# Patient Record
Sex: MALE | Race: WHITE | NOT HISPANIC OR LATINO | Employment: FULL TIME | URBAN - METROPOLITAN AREA
[De-identification: names, ages, dates, MRNs, and addresses within clinical notes are randomized per-mention and may not be internally consistent; named-entity substitution may affect disease eponyms.]

---

## 2023-06-19 ENCOUNTER — OFFICE VISIT (OUTPATIENT)
Dept: PRIMARY CARE | Facility: CLINIC | Age: 33
End: 2023-06-19
Payer: COMMERCIAL

## 2023-06-19 VITALS
HEART RATE: 78 BPM | OXYGEN SATURATION: 97 % | SYSTOLIC BLOOD PRESSURE: 145 MMHG | BODY MASS INDEX: 26.69 KG/M2 | HEIGHT: 74 IN | DIASTOLIC BLOOD PRESSURE: 83 MMHG | WEIGHT: 208 LBS

## 2023-06-19 DIAGNOSIS — Z00.00 PREVENTATIVE HEALTH CARE: Primary | ICD-10-CM

## 2023-06-19 PROCEDURE — 1036F TOBACCO NON-USER: CPT | Performed by: NURSE PRACTITIONER

## 2023-06-19 PROCEDURE — 99385 PREV VISIT NEW AGE 18-39: CPT | Performed by: NURSE PRACTITIONER

## 2023-06-19 ASSESSMENT — PATIENT HEALTH QUESTIONNAIRE - PHQ9
SUM OF ALL RESPONSES TO PHQ9 QUESTIONS 1 AND 2: 0
1. LITTLE INTEREST OR PLEASURE IN DOING THINGS: NOT AT ALL
2. FEELING DOWN, DEPRESSED OR HOPELESS: NOT AT ALL

## 2023-06-19 ASSESSMENT — ENCOUNTER SYMPTOMS
NAUSEA: 0
SORE THROAT: 0
FEVER: 0
VOMITING: 0
WEAKNESS: 0
HEADACHES: 0
DIZZINESS: 0
MUSCULOSKELETAL NEGATIVE: 1
CHILLS: 0
PALPITATIONS: 0
FATIGUE: 0
SHORTNESS OF BREATH: 0
PSYCHIATRIC NEGATIVE: 1
COUGH: 0
DIARRHEA: 0
CONSTIPATION: 0
ABDOMINAL PAIN: 0
NUMBNESS: 0

## 2023-06-19 NOTE — PROGRESS NOTES
"Subjective   Patient ID: Chetan Nam is a 33 y.o. male who presents to establish relationship with primary care provider.      HPI   Lives with girlfriend.No children  Long family history of GI issues, UC and Crohns.  Does not have chronic issues himself.  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  Works many hours weekly.  Nonsmoker, rare alcohol use, no illicit drug use.  Denies chest pain, SOB, palpitations, dizziness,  or GI issues.        Review of Systems   Constitutional:  Negative for chills, fatigue and fever.   HENT:  Negative for congestion, ear pain and sore throat.    Eyes:  Negative for visual disturbance.   Respiratory:  Negative for cough and shortness of breath.    Cardiovascular:  Negative for chest pain, palpitations and leg swelling.   Gastrointestinal:  Negative for abdominal pain, constipation, diarrhea, nausea and vomiting.   Genitourinary: Negative.    Musculoskeletal: Negative.    Skin:  Negative for rash.   Neurological:  Negative for dizziness, weakness, numbness and headaches.   Psychiatric/Behavioral: Negative.         Objective   /83   Pulse 78   Ht 1.88 m (6' 2\")   Wt 94.3 kg (208 lb)   SpO2 97%   BMI 26.71 kg/m²     Physical Exam  Constitutional:       General: He is not in acute distress.     Appearance: Normal appearance.   HENT:      Head: Normocephalic and atraumatic.      Right Ear: Tympanic membrane, ear canal and external ear normal.      Left Ear: Tympanic membrane, ear canal and external ear normal.      Nose: Nose normal.      Mouth/Throat:      Mouth: Mucous membranes are moist.      Pharynx: Oropharynx is clear.   Eyes:      Extraocular Movements: Extraocular movements intact.      Conjunctiva/sclera: Conjunctivae normal.      Pupils: Pupils are equal, round, and reactive to light.   Cardiovascular:      Rate and Rhythm: Normal rate and regular rhythm.      Pulses: Normal pulses.      Heart sounds: Normal heart sounds. No " murmur heard.  Pulmonary:      Effort: Pulmonary effort is normal.      Breath sounds: Normal breath sounds. No wheezing, rhonchi or rales.   Abdominal:      General: Bowel sounds are normal.      Palpations: Abdomen is soft.      Tenderness: There is no abdominal tenderness.   Musculoskeletal:         General: Normal range of motion.      Cervical back: Normal range of motion and neck supple.   Lymphadenopathy:      Comments: No lymphadenopathy noted   Skin:     General: Skin is warm and dry.      Findings: No rash.   Neurological:      General: No focal deficit present.      Mental Status: He is alert and oriented to person, place, and time.      Cranial Nerves: No cranial nerve deficit.      Coordination: Coordination normal.      Gait: Gait normal.   Psychiatric:         Mood and Affect: Mood normal.         Behavior: Behavior normal.         Assessment/Plan   Problem List Items Addressed This Visit          Other    Preventative health care - Primary    Relevant Orders    Comprehensive Metabolic Panel    TSH with reflex to Free T4 if abnormal    Lipid Panel    Vitamin D, Total    CBC and Auto Differential

## 2023-06-21 ENCOUNTER — LAB (OUTPATIENT)
Dept: LAB | Facility: LAB | Age: 33
End: 2023-06-21
Payer: COMMERCIAL

## 2023-06-21 DIAGNOSIS — Z00.00 PREVENTATIVE HEALTH CARE: ICD-10-CM

## 2023-06-21 LAB
ALANINE AMINOTRANSFERASE (SGPT) (U/L) IN SER/PLAS: 23 U/L (ref 10–52)
ALBUMIN (G/DL) IN SER/PLAS: 4.8 G/DL (ref 3.4–5)
ALKALINE PHOSPHATASE (U/L) IN SER/PLAS: 74 U/L (ref 33–120)
ANION GAP IN SER/PLAS: 10 MMOL/L (ref 10–20)
ASPARTATE AMINOTRANSFERASE (SGOT) (U/L) IN SER/PLAS: 16 U/L (ref 9–39)
BASOPHILS (10*3/UL) IN BLOOD BY AUTOMATED COUNT: 0.03 X10E9/L (ref 0–0.1)
BASOPHILS/100 LEUKOCYTES IN BLOOD BY AUTOMATED COUNT: 0.4 % (ref 0–2)
BILIRUBIN TOTAL (MG/DL) IN SER/PLAS: 0.7 MG/DL (ref 0–1.2)
CALCIDIOL (25 OH VITAMIN D3) (NG/ML) IN SER/PLAS: 22 NG/ML
CALCIUM (MG/DL) IN SER/PLAS: 9.2 MG/DL (ref 8.6–10.3)
CARBON DIOXIDE, TOTAL (MMOL/L) IN SER/PLAS: 30 MMOL/L (ref 21–32)
CHLORIDE (MMOL/L) IN SER/PLAS: 106 MMOL/L (ref 98–107)
CHOLESTEROL (MG/DL) IN SER/PLAS: 119 MG/DL (ref 0–199)
CHOLESTEROL IN HDL (MG/DL) IN SER/PLAS: 33.4 MG/DL
CHOLESTEROL/HDL RATIO: 3.6
CREATININE (MG/DL) IN SER/PLAS: 0.9 MG/DL (ref 0.5–1.3)
EOSINOPHILS (10*3/UL) IN BLOOD BY AUTOMATED COUNT: 0.09 X10E9/L (ref 0–0.7)
EOSINOPHILS/100 LEUKOCYTES IN BLOOD BY AUTOMATED COUNT: 1.3 % (ref 0–6)
ERYTHROCYTE DISTRIBUTION WIDTH (RATIO) BY AUTOMATED COUNT: 12.2 % (ref 11.5–14.5)
ERYTHROCYTE MEAN CORPUSCULAR HEMOGLOBIN CONCENTRATION (G/DL) BY AUTOMATED: 34.3 G/DL (ref 32–36)
ERYTHROCYTE MEAN CORPUSCULAR VOLUME (FL) BY AUTOMATED COUNT: 84 FL (ref 80–100)
ERYTHROCYTES (10*6/UL) IN BLOOD BY AUTOMATED COUNT: 5.58 X10E12/L (ref 4.5–5.9)
GFR MALE: >90 ML/MIN/1.73M2
GLUCOSE (MG/DL) IN SER/PLAS: 105 MG/DL (ref 74–99)
HEMATOCRIT (%) IN BLOOD BY AUTOMATED COUNT: 46.9 % (ref 41–52)
HEMOGLOBIN (G/DL) IN BLOOD: 16.1 G/DL (ref 13.5–17.5)
IMMATURE GRANULOCYTES/100 LEUKOCYTES IN BLOOD BY AUTOMATED COUNT: 0.3 % (ref 0–0.9)
LDL: 70 MG/DL (ref 0–99)
LEUKOCYTES (10*3/UL) IN BLOOD BY AUTOMATED COUNT: 7 X10E9/L (ref 4.4–11.3)
LYMPHOCYTES (10*3/UL) IN BLOOD BY AUTOMATED COUNT: 1.49 X10E9/L (ref 1.2–4.8)
LYMPHOCYTES/100 LEUKOCYTES IN BLOOD BY AUTOMATED COUNT: 21.2 % (ref 13–44)
MONOCYTES (10*3/UL) IN BLOOD BY AUTOMATED COUNT: 0.45 X10E9/L (ref 0.1–1)
MONOCYTES/100 LEUKOCYTES IN BLOOD BY AUTOMATED COUNT: 6.4 % (ref 2–10)
NEUTROPHILS (10*3/UL) IN BLOOD BY AUTOMATED COUNT: 4.95 X10E9/L (ref 1.2–7.7)
NEUTROPHILS/100 LEUKOCYTES IN BLOOD BY AUTOMATED COUNT: 70.4 % (ref 40–80)
PLATELETS (10*3/UL) IN BLOOD AUTOMATED COUNT: 199 X10E9/L (ref 150–450)
POTASSIUM (MMOL/L) IN SER/PLAS: 4.7 MMOL/L (ref 3.5–5.3)
PROTEIN TOTAL: 6.6 G/DL (ref 6.4–8.2)
SODIUM (MMOL/L) IN SER/PLAS: 141 MMOL/L (ref 136–145)
THYROTROPIN (MIU/L) IN SER/PLAS BY DETECTION LIMIT <= 0.05 MIU/L: 1.4 MIU/L (ref 0.44–3.98)
TRIGLYCERIDE (MG/DL) IN SER/PLAS: 80 MG/DL (ref 0–149)
UREA NITROGEN (MG/DL) IN SER/PLAS: 24 MG/DL (ref 6–23)
VLDL: 16 MG/DL (ref 0–40)

## 2023-06-21 PROCEDURE — 80061 LIPID PANEL: CPT

## 2023-06-21 PROCEDURE — 80053 COMPREHEN METABOLIC PANEL: CPT

## 2023-06-21 PROCEDURE — 85025 COMPLETE CBC W/AUTO DIFF WBC: CPT

## 2023-06-21 PROCEDURE — 82306 VITAMIN D 25 HYDROXY: CPT

## 2023-06-21 PROCEDURE — 36415 COLL VENOUS BLD VENIPUNCTURE: CPT

## 2023-06-21 PROCEDURE — 84443 ASSAY THYROID STIM HORMONE: CPT

## 2024-04-09 ENCOUNTER — OFFICE VISIT (OUTPATIENT)
Dept: PRIMARY CARE | Facility: CLINIC | Age: 34
End: 2024-04-09
Payer: COMMERCIAL

## 2024-04-09 VITALS
HEIGHT: 74 IN | SYSTOLIC BLOOD PRESSURE: 134 MMHG | TEMPERATURE: 97.6 F | HEART RATE: 96 BPM | OXYGEN SATURATION: 96 % | WEIGHT: 216.6 LBS | DIASTOLIC BLOOD PRESSURE: 86 MMHG | BODY MASS INDEX: 27.8 KG/M2 | RESPIRATION RATE: 18 BRPM

## 2024-04-09 DIAGNOSIS — M25.542 ARTHRALGIA OF BOTH HANDS: ICD-10-CM

## 2024-04-09 DIAGNOSIS — Z13.29 THYROID DISORDER SCREEN: ICD-10-CM

## 2024-04-09 DIAGNOSIS — M25.541 ARTHRALGIA OF BOTH HANDS: ICD-10-CM

## 2024-04-09 DIAGNOSIS — R14.3 FLATULENCE: ICD-10-CM

## 2024-04-09 DIAGNOSIS — R53.82 CHRONIC FATIGUE: ICD-10-CM

## 2024-04-09 DIAGNOSIS — R10.11 RUQ ABDOMINAL PAIN: ICD-10-CM

## 2024-04-09 DIAGNOSIS — Z13.220 SCREENING, LIPID: ICD-10-CM

## 2024-04-09 DIAGNOSIS — R19.5 LOOSE STOOLS: Primary | ICD-10-CM

## 2024-04-09 DIAGNOSIS — M54.50 ACUTE LEFT-SIDED LOW BACK PAIN WITHOUT SCIATICA: ICD-10-CM

## 2024-04-09 PROCEDURE — 1036F TOBACCO NON-USER: CPT | Performed by: NURSE PRACTITIONER

## 2024-04-09 PROCEDURE — 99215 OFFICE O/P EST HI 40 MIN: CPT | Performed by: NURSE PRACTITIONER

## 2024-04-09 RX ORDER — CYCLOBENZAPRINE HCL 5 MG
5 TABLET ORAL NIGHTLY PRN
Qty: 30 TABLET | Refills: 0 | Status: SHIPPED | OUTPATIENT
Start: 2024-04-09 | End: 2024-06-08

## 2024-04-09 RX ORDER — PREDNISONE 20 MG/1
40 TABLET ORAL DAILY
Qty: 10 TABLET | Refills: 0 | Status: SHIPPED | OUTPATIENT
Start: 2024-04-09 | End: 2024-04-14

## 2024-04-09 ASSESSMENT — ENCOUNTER SYMPTOMS
CONSTIPATION: 0
DIZZINESS: 0
VOMITING: 0
WEAKNESS: 0
CHILLS: 0
NAUSEA: 0
HEMATURIA: 0
CONFUSION: 0
BACK PAIN: 1
PALPITATIONS: 0
NERVOUS/ANXIOUS: 0
WHEEZING: 0
DIARRHEA: 1
DIAPHORESIS: 0
ABDOMINAL PAIN: 1
CHEST TIGHTNESS: 0
FATIGUE: 1
SHORTNESS OF BREATH: 0
HEADACHES: 0
FREQUENCY: 0
BLOOD IN STOOL: 0
WOUND: 0
SLEEP DISTURBANCE: 0

## 2024-04-09 ASSESSMENT — PATIENT HEALTH QUESTIONNAIRE - PHQ9
2. FEELING DOWN, DEPRESSED OR HOPELESS: NOT AT ALL
1. LITTLE INTEREST OR PLEASURE IN DOING THINGS: NOT AT ALL
SUM OF ALL RESPONSES TO PHQ9 QUESTIONS 1 AND 2: 0

## 2024-04-09 NOTE — PROGRESS NOTES
"Subjective   Patient ID: Chetan Nam is a 34 y.o. male who presents for New Patient Visit, Back Pain (Lower left side), and Fatigue.    New Patient to establish Care.   Patient with back pain, fatigue.     Back pain: Left lower back, feels like a throbbing pain. Intermittent. Not consistent     He reports he is very active. After short tasks he feels like \" my heart is beating throughout of entire body\".   Feels more worn after doing same job he's always done.   Denies Chest pain     He's questioning lyme disease. Reports a hx of frequent tick bites.   Occasional loose stools and increasing flatulence. Has always associated with his food choices.   Denies blood in stools, nausea, constipation   Mom has crohns and brother has UC      Bilateral hand pain/ worse in mornings. Family hx of OA  Patient has worked manual labor for many years      Back Pain  Associated symptoms include abdominal pain. Pertinent negatives include no chest pain, headaches or weakness.   Fatigue  Associated symptoms include abdominal pain and fatigue. Pertinent negatives include no chest pain, chills, diaphoresis, headaches, nausea, rash, vomiting or weakness.        Review of Systems   Constitutional:  Positive for fatigue. Negative for chills and diaphoresis.   Respiratory:  Negative for chest tightness, shortness of breath and wheezing.    Cardiovascular:  Negative for chest pain and palpitations.   Gastrointestinal:  Positive for abdominal pain and diarrhea. Negative for blood in stool, constipation, nausea and vomiting.   Genitourinary:  Negative for frequency and hematuria.   Musculoskeletal:  Positive for back pain.   Skin:  Negative for rash and wound.   Neurological:  Negative for dizziness, weakness and headaches.   Psychiatric/Behavioral:  Negative for confusion, self-injury, sleep disturbance and suicidal ideas. The patient is not nervous/anxious.        Objective   /86   Pulse 96   Temp 36.4 °C (97.6 °F) (Temporal)   " "Resp 18   Ht 1.88 m (6' 2\")   Wt 98.2 kg (216 lb 9.6 oz)   SpO2 96%   BMI 27.81 kg/m²     Physical Exam  Vitals reviewed.   Constitutional:       Appearance: Normal appearance.   Cardiovascular:      Rate and Rhythm: Normal rate and regular rhythm.      Pulses: Normal pulses.      Heart sounds: Normal heart sounds.   Pulmonary:      Effort: Pulmonary effort is normal.      Breath sounds: Normal breath sounds.   Abdominal:      Tenderness: There is no abdominal tenderness. There is no guarding.   Musculoskeletal:         General: No swelling or tenderness. Normal range of motion.        Arms:       Lumbar back: Spasms present. No swelling or edema. Normal range of motion.        Back:    Skin:     General: Skin is warm and dry.   Neurological:      Mental Status: He is alert and oriented to person, place, and time.   Psychiatric:         Mood and Affect: Mood normal.         Behavior: Behavior normal.         Assessment/Plan   Problem List Items Addressed This Visit             ICD-10-CM    Acute left-sided low back pain without sciatica M54.50     Start prednisone 20 mg as ordered  Flexeril 5 mg nightly as needed  Follow-up 4 weeks  Call for worsening  Low back exercises recommended.  PDF printed for patient         Relevant Medications    predniSONE (Deltasone) 20 mg tablet    cyclobenzaprine (Flexeril) 5 mg tablet    Other Relevant Orders    CBC and Auto Differential    Comprehensive Metabolic Panel    Loose stools - Primary R19.5     Labs, stol collection ordered   Follow up 4-6 weeks   Call for worsening   US gallbladder          Relevant Orders    CBC and Auto Differential    Comprehensive Metabolic Panel    Calprotectin Stool    Tissue Transglutaminase IgG    Flatulence R14.3     Recommend otc gas x as needed   Follow up 4-6 weeks         Relevant Orders    Tissue Transglutaminase IgG    RUQ abdominal pain R10.11     US of gallbladder   Follow up 4-6 weeks   Calprotectin ordered          Relevant Orders    " US gallbladder    Chronic fatigue R53.82     Lyme panel, CBC, iron and tibc, vitamin b12, vitamin d ordered            Relevant Orders    CBC and Auto Differential    Comprehensive Metabolic Panel    Lyme disease, PCR    Vitamin B12    Vitamin D 1,25 Dihydroxy (for eval of hypercalcemia)    Iron and TIBC    Ferritin    Arthralgia of both hands M25.541, M25.542     Otc tylenol 650mg (2 tabs once daily )   Rto in 4-6 weeks            Relevant Orders    Sedimentation Rate    Rheumatoid Factor     Other Visit Diagnoses         Codes    Screening, lipid     Z13.220    Relevant Orders    Lipid Panel    Thyroid disorder screen     Z13.29    Relevant Orders    TSH with reflex to Free T4 if abnormal

## 2024-04-09 NOTE — ASSESSMENT & PLAN NOTE
Start prednisone 20 mg as ordered  Flexeril 5 mg nightly as needed  Follow-up 4 weeks  Call for worsening  Low back exercises recommended.  PDF printed for patient

## 2024-05-01 ENCOUNTER — HOSPITAL ENCOUNTER (OUTPATIENT)
Dept: RADIOLOGY | Facility: CLINIC | Age: 34
Discharge: HOME | End: 2024-05-01
Payer: COMMERCIAL

## 2024-05-01 ENCOUNTER — LAB (OUTPATIENT)
Dept: LAB | Facility: LAB | Age: 34
End: 2024-05-01
Payer: COMMERCIAL

## 2024-05-01 DIAGNOSIS — Z13.220 SCREENING, LIPID: ICD-10-CM

## 2024-05-01 DIAGNOSIS — R10.11 RUQ ABDOMINAL PAIN: ICD-10-CM

## 2024-05-01 DIAGNOSIS — M54.50 ACUTE LEFT-SIDED LOW BACK PAIN WITHOUT SCIATICA: ICD-10-CM

## 2024-05-01 DIAGNOSIS — Z13.29 THYROID DISORDER SCREEN: ICD-10-CM

## 2024-05-01 DIAGNOSIS — M25.541 ARTHRALGIA OF BOTH HANDS: ICD-10-CM

## 2024-05-01 DIAGNOSIS — R19.5 LOOSE STOOLS: ICD-10-CM

## 2024-05-01 DIAGNOSIS — R53.82 CHRONIC FATIGUE: ICD-10-CM

## 2024-05-01 DIAGNOSIS — M25.542 ARTHRALGIA OF BOTH HANDS: ICD-10-CM

## 2024-05-01 DIAGNOSIS — R14.3 FLATULENCE: ICD-10-CM

## 2024-05-01 LAB
ALBUMIN SERPL BCP-MCNC: 4.6 G/DL (ref 3.4–5)
ALP SERPL-CCNC: 63 U/L (ref 33–120)
ALT SERPL W P-5'-P-CCNC: 20 U/L (ref 10–52)
ANION GAP SERPL CALC-SCNC: 9 MMOL/L (ref 10–20)
AST SERPL W P-5'-P-CCNC: 15 U/L (ref 9–39)
BASOPHILS # BLD AUTO: 0.03 X10*3/UL (ref 0–0.1)
BASOPHILS NFR BLD AUTO: 0.5 %
BILIRUB SERPL-MCNC: 1 MG/DL (ref 0–1.2)
BUN SERPL-MCNC: 19 MG/DL (ref 6–23)
CALCIUM SERPL-MCNC: 9.1 MG/DL (ref 8.6–10.3)
CHLORIDE SERPL-SCNC: 105 MMOL/L (ref 98–107)
CHOLEST SERPL-MCNC: 141 MG/DL (ref 0–199)
CHOLESTEROL/HDL RATIO: 3.3
CO2 SERPL-SCNC: 30 MMOL/L (ref 21–32)
CREAT SERPL-MCNC: 0.89 MG/DL (ref 0.5–1.3)
EGFRCR SERPLBLD CKD-EPI 2021: >90 ML/MIN/1.73M*2
EOSINOPHIL # BLD AUTO: 0.06 X10*3/UL (ref 0–0.7)
EOSINOPHIL NFR BLD AUTO: 1 %
ERYTHROCYTE [DISTWIDTH] IN BLOOD BY AUTOMATED COUNT: 12.1 % (ref 11.5–14.5)
ERYTHROCYTE [SEDIMENTATION RATE] IN BLOOD BY WESTERGREN METHOD: <1 MM/H (ref 0–15)
FERRITIN SERPL-MCNC: 315 NG/ML (ref 20–300)
GLUCOSE SERPL-MCNC: 112 MG/DL (ref 74–99)
HCT VFR BLD AUTO: 46.7 % (ref 41–52)
HDLC SERPL-MCNC: 42.5 MG/DL
HGB BLD-MCNC: 15.6 G/DL (ref 13.5–17.5)
IMM GRANULOCYTES # BLD AUTO: 0.02 X10*3/UL (ref 0–0.7)
IMM GRANULOCYTES NFR BLD AUTO: 0.3 % (ref 0–0.9)
IRON SATN MFR SERPL: 44 % (ref 25–45)
IRON SERPL-MCNC: 138 UG/DL (ref 35–150)
LDLC SERPL CALC-MCNC: 78 MG/DL
LYMPHOCYTES # BLD AUTO: 1.33 X10*3/UL (ref 1.2–4.8)
LYMPHOCYTES NFR BLD AUTO: 22.2 %
MCH RBC QN AUTO: 28.5 PG (ref 26–34)
MCHC RBC AUTO-ENTMCNC: 33.4 G/DL (ref 32–36)
MCV RBC AUTO: 85 FL (ref 80–100)
MONOCYTES # BLD AUTO: 0.39 X10*3/UL (ref 0.1–1)
MONOCYTES NFR BLD AUTO: 6.5 %
NEUTROPHILS # BLD AUTO: 4.16 X10*3/UL (ref 1.2–7.7)
NEUTROPHILS NFR BLD AUTO: 69.5 %
NON HDL CHOLESTEROL: 99 MG/DL (ref 0–149)
NRBC BLD-RTO: 0 /100 WBCS (ref 0–0)
PLATELET # BLD AUTO: 182 X10*3/UL (ref 150–450)
POTASSIUM SERPL-SCNC: 4 MMOL/L (ref 3.5–5.3)
PROT SERPL-MCNC: 6.7 G/DL (ref 6.4–8.2)
RBC # BLD AUTO: 5.48 X10*6/UL (ref 4.5–5.9)
SODIUM SERPL-SCNC: 140 MMOL/L (ref 136–145)
TIBC SERPL-MCNC: 313 UG/DL (ref 240–445)
TRIGL SERPL-MCNC: 105 MG/DL (ref 0–149)
TSH SERPL-ACNC: 1.52 MIU/L (ref 0.44–3.98)
UIBC SERPL-MCNC: 175 UG/DL (ref 110–370)
VIT B12 SERPL-MCNC: 317 PG/ML (ref 211–911)
VLDL: 21 MG/DL (ref 0–40)
WBC # BLD AUTO: 6 X10*3/UL (ref 4.4–11.3)

## 2024-05-01 PROCEDURE — 76705 ECHO EXAM OF ABDOMEN: CPT

## 2024-05-01 PROCEDURE — 82652 VIT D 1 25-DIHYDROXY: CPT

## 2024-05-01 PROCEDURE — 84443 ASSAY THYROID STIM HORMONE: CPT

## 2024-05-01 PROCEDURE — 80053 COMPREHEN METABOLIC PANEL: CPT

## 2024-05-01 PROCEDURE — 85025 COMPLETE CBC W/AUTO DIFF WBC: CPT

## 2024-05-01 PROCEDURE — 86431 RHEUMATOID FACTOR QUANT: CPT

## 2024-05-01 PROCEDURE — 82728 ASSAY OF FERRITIN: CPT

## 2024-05-01 PROCEDURE — 85652 RBC SED RATE AUTOMATED: CPT

## 2024-05-01 PROCEDURE — 87476 LYME DIS DNA AMP PROBE: CPT

## 2024-05-01 PROCEDURE — 83550 IRON BINDING TEST: CPT

## 2024-05-01 PROCEDURE — 82607 VITAMIN B-12: CPT

## 2024-05-01 PROCEDURE — 83993 ASSAY FOR CALPROTECTIN FECAL: CPT

## 2024-05-01 PROCEDURE — 83516 IMMUNOASSAY NONANTIBODY: CPT

## 2024-05-01 PROCEDURE — 76705 ECHO EXAM OF ABDOMEN: CPT | Performed by: RADIOLOGY

## 2024-05-01 PROCEDURE — 36415 COLL VENOUS BLD VENIPUNCTURE: CPT

## 2024-05-01 PROCEDURE — 80061 LIPID PANEL: CPT

## 2024-05-01 PROCEDURE — 83540 ASSAY OF IRON: CPT

## 2024-05-02 ENCOUNTER — TELEPHONE (OUTPATIENT)
Dept: PRIMARY CARE | Facility: CLINIC | Age: 34
End: 2024-05-02
Payer: COMMERCIAL

## 2024-05-02 LAB — RHEUMATOID FACT SER NEPH-ACNC: <10 IU/ML (ref 0–15)

## 2024-05-03 LAB
1,25(OH)2D3 SERPL-MCNC: 49.7 PG/ML (ref 19.9–79.3)
CALPROTECTIN STL-MCNT: 23 UG/G
TTG IGG SER IA-ACNC: <0.82 FLU (ref 0–4.99)

## 2024-05-04 LAB
B BURGDOR DNA SPEC QL NAA+PROBE: NOT DETECTED
SPECIMEN SOURCE: NORMAL

## 2024-05-14 ENCOUNTER — OFFICE VISIT (OUTPATIENT)
Dept: PRIMARY CARE | Facility: CLINIC | Age: 34
End: 2024-05-14
Payer: COMMERCIAL

## 2024-05-14 VITALS
BODY MASS INDEX: 28.34 KG/M2 | DIASTOLIC BLOOD PRESSURE: 76 MMHG | OXYGEN SATURATION: 94 % | TEMPERATURE: 98.2 F | HEART RATE: 71 BPM | SYSTOLIC BLOOD PRESSURE: 131 MMHG | HEIGHT: 74 IN | WEIGHT: 220.8 LBS

## 2024-05-14 DIAGNOSIS — E78.00 ELEVATED LDL CHOLESTEROL LEVEL: ICD-10-CM

## 2024-05-14 DIAGNOSIS — R10.11 RUQ ABDOMINAL PAIN: ICD-10-CM

## 2024-05-14 DIAGNOSIS — E53.8 B12 DEFICIENCY: ICD-10-CM

## 2024-05-14 DIAGNOSIS — Z13.29 THYROID DISORDER SCREENING: ICD-10-CM

## 2024-05-14 DIAGNOSIS — R19.5 LOOSE STOOLS: ICD-10-CM

## 2024-05-14 DIAGNOSIS — M15.9 PRIMARY OSTEOARTHRITIS INVOLVING MULTIPLE JOINTS: Primary | ICD-10-CM

## 2024-05-14 PROBLEM — M15.0 PRIMARY OSTEOARTHRITIS INVOLVING MULTIPLE JOINTS: Status: ACTIVE | Noted: 2024-05-14

## 2024-05-14 PROCEDURE — 1036F TOBACCO NON-USER: CPT | Performed by: NURSE PRACTITIONER

## 2024-05-14 PROCEDURE — 99213 OFFICE O/P EST LOW 20 MIN: CPT | Performed by: NURSE PRACTITIONER

## 2024-05-14 ASSESSMENT — ENCOUNTER SYMPTOMS
NAUSEA: 0
ABDOMINAL PAIN: 0
NERVOUS/ANXIOUS: 0
WEAKNESS: 0
ROS GI COMMENTS: LOOSE STOOLS
ACTIVITY CHANGE: 0
FEVER: 0
PALPITATIONS: 0
DIZZINESS: 0
HEADACHES: 0
CHILLS: 0
RESPIRATORY NEGATIVE: 1
VOMITING: 0
BACK PAIN: 1
APNEA: 0
SHORTNESS OF BREATH: 0
CONSTITUTIONAL NEGATIVE: 1
CONFUSION: 0
COUGH: 0
ARTHRALGIAS: 1
FATIGUE: 0

## 2024-05-14 NOTE — ASSESSMENT & PLAN NOTE
.=== 05/01/24 ===    US GALLBLADDER    - Impression -  Grossly unremarkable right upper quadrant ultrasound.

## 2024-05-14 NOTE — ASSESSMENT & PLAN NOTE
Referral to ortho   No significant findings   Recommend tylenol 650mg prn   No improvement on prednisone   Not taking flexeril ??  Rheumatoid factor negative, sed rate unremarkable

## 2024-05-14 NOTE — PROGRESS NOTES
"Subjective   Patient ID: Chetan Nam is a 34 y.o. male who presents for Follow-up (4-6 week follow up labs ), Arthritis, and Diarrhea.    Low Back Pain: \" thought I felt worse on prednisone\"   Pain worsened. Continued course of treatment. \" Now I maybe feel better\" Described as a dull ache. Intermittent   Pain in lower back on left side. Denies hx of kidney stones, denies hematuria   Complains of knee pain, bilateral hand pain  Neg rheumatoid factor, sed rate  Family hx of rheumatoid arthritis   No swelling reported, no fever.  Recommended tylenol daily but reports he has not tried taking daily       Loose stools: intermittent loose stools continue   Negative calprotectin, normal CBC   2-3 times a day occasionally   Denies n/v   No specific diet changes.            Review of Systems   Constitutional: Negative.  Negative for activity change, chills, fatigue and fever.   Respiratory: Negative.  Negative for apnea, cough and shortness of breath.    Cardiovascular:  Negative for chest pain and palpitations.   Gastrointestinal:  Negative for abdominal pain, nausea and vomiting.        Loose stools    Musculoskeletal:  Positive for arthralgias and back pain.   Neurological:  Negative for dizziness, weakness and headaches.   Psychiatric/Behavioral:  Negative for confusion. The patient is not nervous/anxious.        Objective   /76 (BP Location: Left arm, Patient Position: Sitting)   Pulse 71   Temp 36.8 °C (98.2 °F)   Ht 1.88 m (6' 2\")   Wt 100 kg (220 lb 12.8 oz)   SpO2 94%   BMI 28.35 kg/m²     Physical Exam  Vitals reviewed.   Constitutional:       Appearance: Normal appearance.   Cardiovascular:      Rate and Rhythm: Normal rate and regular rhythm.      Pulses: Normal pulses.      Heart sounds: Normal heart sounds.   Pulmonary:      Effort: Pulmonary effort is normal.      Breath sounds: Normal breath sounds.   Abdominal:      General: Bowel sounds are normal.   Musculoskeletal:         General: No " swelling. Normal range of motion.   Neurological:      Mental Status: He is alert and oriented to person, place, and time.   Psychiatric:         Mood and Affect: Mood normal.         Behavior: Behavior normal.         Assessment/Plan   Problem List Items Addressed This Visit             ICD-10-CM    Loose stools R19.5     Referral to GI for eval   Take otc probiotic, increase fiber   Keep food journal daily          Relevant Orders    Referral to Gastroenterology    RUQ abdominal pain R10.11     .=== 05/01/24 ===    US GALLBLADDER    - Impression -  Grossly unremarkable right upper quadrant ultrasound.             Primary osteoarthritis involving multiple joints - Primary M15.9     Referral to ortho   No significant findings   Recommend tylenol 650mg prn   No improvement on prednisone   Not taking flexeril ??  Rheumatoid factor negative, sed rate unremarkable          Relevant Orders    Referral to Orthopaedic Surgery

## 2024-05-14 NOTE — PROGRESS NOTES
"Subjective   Patient ID: Chetan Nam is a 34 y.o. male who presents for Follow-up (4-6 week follow up labs ).        RUQ abdominal pain   US of gallbladder completed on 5/1/24, negative for gallstones and distention, absent of renal calculi, hydronephrosis, and Liver negative for lesions. Calprotectin was 23, CBC, and CMP unremarkable.      Lower left back pain  predniSONE (Deltasone) 20 mg tablet and cyclobenzaprine (Flexeril) 5 mg tablets started last visit. Patient stated pain/ soreness has radiated to elbows and shoulders. Patient took flexeril for 3 days and prednisone for 5 days and has not had pain has not improved.    Loose stools  Patient states he is still having loose stools once a week, denies blood in stool, flatulence, and abdominal pain/cramping, no OTC treatment, reports no change in diet.             Review of Systems    Objective   /76 (BP Location: Left arm, Patient Position: Sitting)   Pulse 71   Temp 36.8 °C (98.2 °F)   Ht 1.88 m (6' 2\")   Wt 100 kg (220 lb 12.8 oz)   SpO2 94%   BMI 28.35 kg/m²     Physical Exam    Assessment/Plan          "

## 2024-05-17 ENCOUNTER — HOSPITAL ENCOUNTER (OUTPATIENT)
Dept: RADIOLOGY | Facility: EXTERNAL LOCATION | Age: 34
Discharge: HOME | End: 2024-05-17

## 2024-05-17 ENCOUNTER — HOSPITAL ENCOUNTER (OUTPATIENT)
Dept: RADIOLOGY | Facility: CLINIC | Age: 34
Discharge: HOME | End: 2024-05-17
Payer: COMMERCIAL

## 2024-05-17 ENCOUNTER — OFFICE VISIT (OUTPATIENT)
Dept: ORTHOPEDIC SURGERY | Facility: CLINIC | Age: 34
End: 2024-05-17
Payer: COMMERCIAL

## 2024-05-17 VITALS — WEIGHT: 220 LBS | HEIGHT: 74 IN | BODY MASS INDEX: 28.23 KG/M2

## 2024-05-17 DIAGNOSIS — M25.561 ACUTE BILATERAL KNEE PAIN: ICD-10-CM

## 2024-05-17 DIAGNOSIS — M17.0 OSTEOARTHRITIS OF PATELLOFEMORAL JOINTS, BILATERAL: Primary | ICD-10-CM

## 2024-05-17 DIAGNOSIS — M25.562 ACUTE BILATERAL KNEE PAIN: ICD-10-CM

## 2024-05-17 PROCEDURE — 73564 X-RAY EXAM KNEE 4 OR MORE: CPT | Mod: BILATERAL PROCEDURE | Performed by: RADIOLOGY

## 2024-05-17 PROCEDURE — 20611 DRAIN/INJ JOINT/BURSA W/US: CPT | Performed by: EMERGENCY MEDICINE

## 2024-05-17 PROCEDURE — 99244 OFF/OP CNSLTJ NEW/EST MOD 40: CPT | Performed by: EMERGENCY MEDICINE

## 2024-05-17 PROCEDURE — 73564 X-RAY EXAM KNEE 4 OR MORE: CPT | Mod: 50

## 2024-05-17 RX ORDER — LIDOCAINE HYDROCHLORIDE 10 MG/ML
4 INJECTION INFILTRATION; PERINEURAL
Status: COMPLETED | OUTPATIENT
Start: 2024-05-17 | End: 2024-05-17

## 2024-05-17 RX ORDER — METHYLPREDNISOLONE ACETATE 40 MG/ML
80 INJECTION, SUSPENSION INTRA-ARTICULAR; INTRALESIONAL; INTRAMUSCULAR; SOFT TISSUE
Status: COMPLETED | OUTPATIENT
Start: 2024-05-17 | End: 2024-05-17

## 2024-05-17 RX ADMIN — METHYLPREDNISOLONE ACETATE 80 MG: 40 INJECTION, SUSPENSION INTRA-ARTICULAR; INTRALESIONAL; INTRAMUSCULAR; SOFT TISSUE at 10:44

## 2024-05-17 RX ADMIN — LIDOCAINE HYDROCHLORIDE 4 ML: 10 INJECTION INFILTRATION; PERINEURAL at 10:44

## 2024-05-17 NOTE — PROGRESS NOTES
Subjective    Patient ID: Chetan Nam is a 34 y.o. male.    Chief Complaint: Pain of the Right Knee (Referred by Skylar Joy/Started about 8 years ago, denies any injury. Worse in the morning. Notices most when he is relaxing. He tried Prednisone and feels like that made it worse. Has tried Cyclobenzaprine and states that doesn't help./Arthritis runs the family. /Xr today ) and Pain of the Left Knee     Last Surgery: No surgery found  Last Surgery Date: No surgery found    Chetan is a very pleasant 34-year-old male who works as a  squatting and on his hands and knees frequently throughout the day coming in with some acute on chronic bilateral knee pain that is peripatellar moderate in severity and worse with activity.  He was referred here by his PCP, Skylar Joy, who recently gave him a short course of prednisone that did not provide much relief.  He had x-rays performed on 5/14/2024.  His pain is moderate here today.  He wears knee protection at work most of the time.  He denies any trauma.  No infectious symptoms.  Other than the prednisone he is not currently taking any other medications for his pain.  He would like to avoid surgery if at all possible.        Objective   Right Knee Exam     Muscle Strength   The patient has normal right knee strength.    Tenderness   Right knee tenderness location: Positive patellar grind.    Range of Motion   The patient has normal right knee ROM.  Extension:  normal   Flexion:  normal     Tests   Gilmar:  Medial - negative Lateral - negative  Varus: negative Valgus: negative  Lachman:  Anterior - negative      Drawer:  Anterior - negative    Posterior - negative    Other   Erythema: absent  Sensation: normal  Pulse: present  Swelling: none  Effusion: no effusion present      Left Knee Exam     Muscle Strength   The patient has normal left knee strength.    Tenderness   Left knee tenderness location: Positive patellar grind.    Range of Motion   The patient has  normal left knee ROM.  Extension:  normal   Flexion:  normal     Tests   Gilmar:  Medial - negative Lateral - negative  Varus: negative Valgus: negative  Lachman:  Anterior - negative      Drawer:  Anterior - negative     Posterior - negative    Other   Erythema: absent  Sensation: normal  Pulse: present  Swelling: none  Effusion: no effusion present            Image Results:  Point of Care Ultrasound  These images are not reportable by radiology and will not be interpreted   by  Radiologists.    Bilateral knee x-rays were reviewed and interpreted by me on 5/17/2024 and revealed moderate to severe patellofemoral DJD without any evidence of acute injuries or fractures.    Patient ID: Chetan Nam is a 34 y.o. male.    L Inj/Asp: bilateral knee on 5/17/2024 10:44 AM  Indications: pain and joint swelling  Details: 22 G needle, ultrasound-guided superolateral approach  Medications (Right): 80 mg methylPREDNISolone acetate 40 mg/mL; 4 mL lidocaine 10 mg/mL (1 %)  Medications (Left): 4 mL lidocaine 10 mg/mL (1 %); 80 mg methylPREDNISolone acetate 40 mg/mL  Outcome: tolerated well, no immediate complications  Procedure, treatment alternatives, risks and benefits explained, specific risks discussed. Consent was given by the patient. Immediately prior to procedure a time out was called to verify the correct patient, procedure, equipment, support staff and site/side marked as required. Patient was prepped and draped in the usual sterile fashion.           Assessment/Plan   Encounter Diagnoses:  Osteoarthritis of patellofemoral joints, bilateral    Acute bilateral knee pain    Orders Placed This Encounter    L Inj/Asp    XR knee 4+ views bilateral    Point of Care Ultrasound    Referral to Physical Therapy     No follow-ups on file.    We discussed his treatment options and agreed to perform bilateral knee injections here today under ultrasound guidance.  The patient tolerated the procedures well without any  complications.  He will also be sent to physical therapy with instructions to develop and implement a home exercise program.  We talked about him starting prescription dose Tylenol 3 times daily along with Voltaren as needed for his pain and breakthrough symptoms.  He will follow-up with me in about 4 weeks to determine his response to this plan.  At that time we could consider viscosupplementation or PRP injections as needed.  Both were discussed today.    ** Please excuse any errors in grammar or translation related to this dictation. Voice recognition software was utilized to prepare this document. **       Fredrick Markham MD  [facility]

## 2024-05-23 DIAGNOSIS — M79.641 BILATERAL HAND PAIN: ICD-10-CM

## 2024-05-23 DIAGNOSIS — M79.642 BILATERAL HAND PAIN: ICD-10-CM

## 2024-05-30 ENCOUNTER — OFFICE VISIT (OUTPATIENT)
Dept: ORTHOPEDIC SURGERY | Facility: CLINIC | Age: 34
End: 2024-05-30
Payer: COMMERCIAL

## 2024-05-30 ENCOUNTER — HOSPITAL ENCOUNTER (OUTPATIENT)
Dept: RADIOLOGY | Facility: HOSPITAL | Age: 34
Discharge: HOME | End: 2024-05-30
Payer: COMMERCIAL

## 2024-05-30 VITALS — WEIGHT: 220 LBS | BODY MASS INDEX: 28.23 KG/M2 | HEIGHT: 74 IN

## 2024-05-30 DIAGNOSIS — M79.642 BILATERAL HAND PAIN: ICD-10-CM

## 2024-05-30 DIAGNOSIS — G56.01 CARPAL TUNNEL SYNDROME OF RIGHT WRIST: ICD-10-CM

## 2024-05-30 DIAGNOSIS — G56.03 BILATERAL CARPAL TUNNEL SYNDROME: ICD-10-CM

## 2024-05-30 DIAGNOSIS — M79.641 BILATERAL HAND PAIN: ICD-10-CM

## 2024-05-30 DIAGNOSIS — G56.02 CARPAL TUNNEL SYNDROME ON LEFT: Primary | ICD-10-CM

## 2024-05-30 PROCEDURE — 73130 X-RAY EXAM OF HAND: CPT | Mod: BILATERAL PROCEDURE | Performed by: RADIOLOGY

## 2024-05-30 PROCEDURE — L3908 WHO COCK-UP NONMOLDE PRE OTS: HCPCS | Performed by: ORTHOPAEDIC SURGERY

## 2024-05-30 PROCEDURE — 1036F TOBACCO NON-USER: CPT | Performed by: ORTHOPAEDIC SURGERY

## 2024-05-30 PROCEDURE — 73130 X-RAY EXAM OF HAND: CPT | Mod: 50

## 2024-05-30 PROCEDURE — 99204 OFFICE O/P NEW MOD 45 MIN: CPT | Performed by: ORTHOPAEDIC SURGERY

## 2024-05-30 NOTE — PROGRESS NOTES
34 y.o. male presents today for evaluation of bilateral index ring and long finger MCP pain and hand numbness, tingling, weakness and pain. The patient reports symptoms for months, getting worse.  Pain is controlled.  Reports no previous surgeries, injections or trauma to the area.  Reports pain worse with use, better at rest.  Pain numb and tingly, can wake them from sleep.   Worse driving a car and talking on a phone.   Right worse than left.  Works as a  and worse during work.    Review of Systems    Constitutional: see HPI, no fever, no chills, not feeling tired, no significant weight gain or weight loss.   Eyes: No vision changes  ENT: no nosebleeds.   Cardiovascular: no chest pain.   Respiratory: no shortness of breath and no cough.   Gastrointestinal: no abdominal pain, no nausea, no vomiting and no diarrhea.   Musculoskeletal: per HPI  Neurological: no headache, no gait disturbances  Psychiatric: no depression and no sleep disturbances.   Endocrine: no muscle weakness and no muscle cramps.   Hematologic/Lymphatic: no swollen glands and no tendency for easy bruising or excessive swelling.     Patient's past medical history, past surgical history, allergies, and medications have been reviewed unless otherwise noted in the chart.     Finger Exam  Inspection:  no evidence of infection, no erythema, mild edema, no ecchymosis, Palpation:  compartments are soft, no tenderness with palpation MCP or A1 pulleys, Range of Motion:  full wrist and finger range of motion, Stability:  no wrist instability, Strength:  5/5 wrist and finger flexion/extension, Skin:  intact, Vascular:  capillary refill <2 seconds distally, Sensation:  sensation intact to light touch distally, Other:  no pain with palpation or motion proximally in the elbow.        Carpal Tunnel Exam  Inspection:  no evidence of infection, no edema, no erythema, no ecchymosis, Palpation:  compartments are soft, no pain with palpation, Range of Motion:   full wrist and finger range of motion, Stability:  no wrist instability detected, Strength:  5/5 APB and intrinsics, Skin:  intact, Vascular:  capillary refill <2 seconds distally, Sensation:  decreased in the median nerve distribution, Test: Negative Tinel's at the Carpal Tunnel, negative direct Carpal Tunnel Compression Test      Constitutional   General appearance: Alert and in no acute distress. Well developed, well nourished.    Eyes   External Eye, Conjunctiva and lids: Normal external exam - pupils were equal in size, round, reactive to light (PERRL) with normal accommodation and extraocular movements intact (EOMI).   Ears, Nose, Mouth, and Throat   Hearing: Normal.   Neck   Neck: No neck mass was observed. Supple.   Pulmonary   Respiratory effort: No respiratory distress.   Cardiovascular   Examination of extremities: No peripheral edema.   Psychiatric   Judgment and insight: Intact.   Orientation to person, place, and time: Alert and oriented x 3.       Mood and affect: Normal.      XR - no fractures, no dislocations, or no osseous abnormalities bilateral wrists    Bilateral carpal tunnel syndrome  Based on the history, physical exam and imaging studies above, the patient's presentation is consistent with the above diagnosis.  I had a long discussion with the patient regarding their presentation and the treatment options.  We discussed initial nonoperative versus operative management options as well as potential further diagnostic imaging.  We again discussed her treatment options going forward along with their associated risks and benefits. After thorough discussion, the patient has elected to proceed with conservative management. All questions were answered to the patients satisfaction who seems satisfied with the plan.  They will call the office with any questions/concerns.    Night splints  Voltaren gel to MCP joints as needed  EMG  Follow-up after EMG

## 2024-05-30 NOTE — PROGRESS NOTES
New patient bilateral hand pain mainly in the knuckle area, no known injury patient is Right hand dom, he has been seeing Dr. Gonzalez for Knee arthritis, injections. Xrays sone today

## 2024-06-10 ENCOUNTER — EVALUATION (OUTPATIENT)
Dept: PHYSICAL THERAPY | Facility: CLINIC | Age: 34
End: 2024-06-10
Payer: COMMERCIAL

## 2024-06-10 DIAGNOSIS — M17.0 OSTEOARTHRITIS OF PATELLOFEMORAL JOINTS, BILATERAL: Primary | ICD-10-CM

## 2024-06-10 PROCEDURE — 97161 PT EVAL LOW COMPLEX 20 MIN: CPT | Mod: GP | Performed by: PHYSICAL THERAPIST

## 2024-06-10 PROCEDURE — 97110 THERAPEUTIC EXERCISES: CPT | Mod: GP | Performed by: PHYSICAL THERAPIST

## 2024-06-10 ASSESSMENT — PAIN - FUNCTIONAL ASSESSMENT: PAIN_FUNCTIONAL_ASSESSMENT: 0-10

## 2024-06-10 ASSESSMENT — ENCOUNTER SYMPTOMS
OCCASIONAL FEELINGS OF UNSTEADINESS: 0
LOSS OF SENSATION IN FEET: 0
DEPRESSION: 0

## 2024-06-10 ASSESSMENT — PATIENT HEALTH QUESTIONNAIRE - PHQ9
1. LITTLE INTEREST OR PLEASURE IN DOING THINGS: NOT AT ALL
2. FEELING DOWN, DEPRESSED OR HOPELESS: NOT AT ALL
SUM OF ALL RESPONSES TO PHQ9 QUESTIONS 1 AND 2: 0

## 2024-06-10 ASSESSMENT — PAIN DESCRIPTION - DESCRIPTORS: DESCRIPTORS: ACHING

## 2024-06-10 NOTE — PROGRESS NOTES
Physical Therapy    Physical Therapy Lower Extremity Evaluation    Patient Name: Chetan Nam  MRN: [id number]  : 1990   Today's Date: 6/10/2024  Time Calculation  Start Time: 415  Stop Time: 050  Time Calculation (min): 50 min  PT Evaluation Time Entry  PT Evaluation (Low) Time Entry: 35  PT Therapeutic Procedures Time Entry  Therapeutic Exercise Time Entry: 15             Visit: 1  Auth:     Current Problem  Problem List Items Addressed This Visit    None  Visit Diagnoses         Codes    Osteoarthritis of patellofemoral joints, bilateral    -  Primary M17.0    Relevant Orders    Follow Up In Physical Therapy               SUBJECTIVE  Subjective     General  Name and  confirmed with patient on this date.       Precautions  Precautions  STEADI Fall Risk Score (The score of 4 or more indicates an increased risk of falling): 0  Precautions Comment: none       Pain  Pain Assessment: 0-10  Pain Score:  (ranges from 1-3/10)  Pain Location: Knee  Pain Orientation: Left, Right  Pain Descriptors: Aching    SUBJECTIVE:   Chief complaint:  Pt is 33 yo male who presents with bilat anterior knee pain which he has had to some degree for past 10 years. Pain has increased more recently. Pt is a  and performs squatting activities and is on his hands and knees during the day. Pt had steroid injection into bilat knees on 24 which seemed to help. Has been taking tylenol 3X/day per Dr. Markham. Wears knee pads if he's on his knees for extended periods. Discussed knee savers for prolonged squatting.    Pain Better: movement  Pain Worse: sitting  Imaging: moderate to severe PF DJD  Prior level of function: Independent  Current limitations:   Home setup: two story  Work:   Patients goal: Extend my knees as long as I can    OBJECTIVE:    Lower Extremity Strength: (5/5 unless noted)  MMT RIGHT LEFT   Hip Flexion     Hip Extension     Hip Abduction     Hip Adduction     Knee Extension 4+/5 4+/5   Knee  "Flexion     Ankle DF     Ankle PF     Ankle INV     Ankle EV       Lower Extremity ROM: WNL unless documented below:  ROM in Degrees  RIGHT LEFT   Hip Flexion     Hip Extension     Hip Abduction     Hip Adduction     Knee Extension 0 0   Knee Flexion 127 129   Ankle DF     Ankle PF     Ankle INV     Ankle EV       Joint mobility: WNL  Posture: WNL  Gait mechanics: no deviation with ambulation  Palpation: no tenderness with palpation  Neurological symptoms: none  Hamstring flexibility: right:   65 degrees    left: 70 degrees      KNEE RIGHT LEFT   Gilmar's negative negative        Joint Line Tenderness RIGHT LEFT   Anterior Drawer negative negative                      Functional Outcome:   Other Measures  Lower Extremity Funtional Score (LEFS): 77    TREATMENT:  EXERCISES Date 6/10/24 Date Date Date    REPS REPS REPS REPS   SLR 5\"H X 20      SLR with ER 5\"H X 20             Seated hamstring stretch 30\" X 3      Hip flexor stretch next      Shuttle DLP 8B  3 X 10      Shuttle DTR 8B  3 X 10      Shuttle SLP 6B  3 X 10             Hip abduction       Hip flexion              Sidestep with band                                                                                                                              HEP          Access Code: [email or web address]  URL: [email or web address]  Date: 06/10/2024  Prepared by: Leonela Diaz    Exercises  - Small Range Straight Leg Raise  - 1 x daily - 7 x weekly - 2 sets - 10 reps  - Straight Leg Raise with External Rotation  - 1 x daily - 7 x weekly - 2 sets - 10 reps  - Seated Hamstring Stretch with Chair  - 1 x daily - 7 x weekly - 1 sets - 3 reps - 30 hold      ASSESSEMENT  Pt is a 34 y.o. referred to physical therapy for a dx of OA bilat PF joints by Fredrick Markham MD . Pt presents with anterior knee pain bilat, decreased LE strength and flexibility. This pt would benefit from a therapy program to restore prior level of function, reduce pain, increase " AROM, increase strength, and improve gait and balance.     The physical therapy prognosis is good for the patient to achieve their goals.   The pt tolerated therapy treatment today well with no adverse effects.  Barriers to therapy include:  none    PLAN  The pt will be seen 1 day a week for 4 weeks.  Will focus on developing HEP that patient can perform independently.    The pt has been educated about the risks and benefits of physical therapy and gives consent for treatment.     The patient will benefit from physical therapy treatment to include:  LE strengthening, flexibility, proprioception, modalities PRN.    Goals:  1.Improve hamstring flexibility by 15 degrees bilat-4 weeks  2.Improve bilat knee extension strength to 5/5 bilat-4 weeks  3.Pt will demonstrate 90% teach back with good technique of HEP-4 weeks

## 2024-06-14 ENCOUNTER — APPOINTMENT (OUTPATIENT)
Dept: ORTHOPEDIC SURGERY | Facility: CLINIC | Age: 34
End: 2024-06-14
Payer: COMMERCIAL

## 2024-06-14 VITALS — WEIGHT: 220 LBS | HEIGHT: 74 IN | BODY MASS INDEX: 28.23 KG/M2

## 2024-06-14 DIAGNOSIS — M17.0 OSTEOARTHRITIS OF PATELLOFEMORAL JOINTS, BILATERAL: Primary | ICD-10-CM

## 2024-06-14 PROCEDURE — 99213 OFFICE O/P EST LOW 20 MIN: CPT | Performed by: EMERGENCY MEDICINE

## 2024-06-24 ENCOUNTER — TREATMENT (OUTPATIENT)
Dept: PHYSICAL THERAPY | Facility: CLINIC | Age: 34
End: 2024-06-24
Payer: COMMERCIAL

## 2024-06-24 DIAGNOSIS — M17.0 OSTEOARTHRITIS OF PATELLOFEMORAL JOINTS, BILATERAL: ICD-10-CM

## 2024-06-24 PROCEDURE — 97110 THERAPEUTIC EXERCISES: CPT | Mod: GP | Performed by: PHYSICAL THERAPIST

## 2024-06-24 ASSESSMENT — PAIN SCALES - GENERAL: PAINLEVEL_OUTOF10: 1

## 2024-06-24 ASSESSMENT — PAIN - FUNCTIONAL ASSESSMENT: PAIN_FUNCTIONAL_ASSESSMENT: 0-10

## 2024-06-24 NOTE — PROGRESS NOTES
"Physical Therapy    Physical Therapy Treatment    Patient Name: Chetan Nam  MRN: [id number]  : 1990   Today's Date: 2024  Time Calculation  Start Time: 410  Stop Time: 440  Time Calculation (min): 30 min     PT Therapeutic Procedures Time Entry  Therapeutic Exercise Time Entry: 30             Visit: 2  Auth: 40 visits    Current Problem  Problem List Items Addressed This Visit    None  Visit Diagnoses         Codes    Osteoarthritis of patellofemoral joints, bilateral     M17.0               SUBJECTIVE  Subjective     General  Name and  confirmed with patient on this date.       Precautions  Precautions  Precautions Comment: none       Pain  Pain Assessment: 0-10  0-10 (Numeric) Pain Score: 1  Pain Location: Knee  Pain Orientation: Right    SUBJECTIVE:   Pt reports that his knees are feeling pretty good. Right knee is a little sore. Has been very busy at work and not been doing HEP consistently. No longer taking Tylenol. Pt reports that he is working 15+ hour days.     Pain Better: movement  Pain Worse: sitting  Imaging: moderate to severe PF DJD  Prior level of function: Independent  Current limitations:   Home setup: two story  Work:   Patients goal: Extend my knees as long as I can    OBJECTIVE:    Lower Extremity Strength: (5/5 unless noted)  MMT RIGHT LEFT   Hip Flexion     Hip Extension     Hip Abduction     Hip Adduction     Knee Extension 4+/5 4+/5   Knee Flexion     Ankle DF     Ankle PF     Ankle INV     Ankle EV       Lower Extremity ROM: WNL unless documented below:  ROM in Degrees  RIGHT LEFT   Hip Flexion     Hip Extension     Hip Abduction     Hip Adduction     Knee Extension 0 0   Knee Flexion 127 129   Ankle DF     Ankle PF     Ankle INV     Ankle EV           Functional Outcome:        TREATMENT:  EXERCISES Date 6/10/24 Date 24 Date Date    REPS REPS REPS REPS   SLR 5\"H X 20      SLR with ER 5\"H X 20      Nustep   L5 5 mins     Seated hamstring stretch 30\" X 3    " "  Hip flexor stretch on step next 30\" X 3     Shuttle DLP 8B  3 X 10 8B  3 X 10     Shuttle DTR 8B  3 X 10 8B  3 X 10     Shuttle SLP 6B  3 X 10 6B  3 X 10            Hip abduction       Hip flexion              Sidestep with band  Black 1 lap ea            Wall squat  5\"H X 10                                                                                                              HEP           Access Code: [email or web address]  URL: [email or web address]  Date: 06/24/2024  Prepared by: Leonela Diaz    Exercises  - Side Stepping with Resistance at Ankles  - 1 x daily - 7 x weekly - 1 sets - 10 reps  - Wall Squat  - 1 x daily - 7 x weekly - 1 sets - 10 reps - 5 hold  - Hip Flexor Stretch on Step  - 1 x daily - 7 x weekly - 1 sets - 3 reps - 30 hold    ASSESSMENT  Pt is inconsistent performing HEP due to working such long hours every day. Overall knees are doing well and not impeding his work activities.    PLAN  The pt will be seen 1 day a week for 4 weeks.  Will focus on developing HEP that patient can perform independently.    The pt has been educated about the risks and benefits of physical therapy and gives consent for treatment.     The patient will benefit from physical therapy treatment to include:  LE strengthening, flexibility, proprioception, modalities PRN.    Goals:  1.Improve hamstring flexibility by 15 degrees bilat-4 weeks  2.Improve bilat knee extension strength to 5/5 bilat-4 weeks  3.Pt will demonstrate 90% teach back with good technique of HEP-4 weeks        "

## 2024-07-02 ENCOUNTER — TREATMENT (OUTPATIENT)
Dept: PHYSICAL THERAPY | Facility: CLINIC | Age: 34
End: 2024-07-02
Payer: COMMERCIAL

## 2024-07-02 DIAGNOSIS — M17.0 OSTEOARTHRITIS OF PATELLOFEMORAL JOINTS, BILATERAL: Primary | ICD-10-CM

## 2024-07-02 PROCEDURE — 97110 THERAPEUTIC EXERCISES: CPT | Mod: GP,CQ

## 2024-07-02 ASSESSMENT — PAIN - FUNCTIONAL ASSESSMENT: PAIN_FUNCTIONAL_ASSESSMENT: 0-10

## 2024-07-02 ASSESSMENT — PAIN DESCRIPTION - DESCRIPTORS: DESCRIPTORS: ACHING;TIGHTNESS

## 2024-07-02 NOTE — PROGRESS NOTES
"Physical Therapy    Physical Therapy Treatment    Patient Name: Chetan Nam  MRN: [id number]  :1990  Today's Date: 2024  Time Calculation  Start Time: 0800  Stop Time: 0850  Time Calculation (min): 50 min  PT Therapeutic Procedures Time Entry  Therapeutic Exercise Time Entry: 48            Visit:  3  Visit limit: 40 visits per year    Assessment:   Patient performed added exercises without complaints of increased bilateral retropatellar symptoms. Patient reports continues to experience right retropatellar symptoms more so than left knee with performing daily and work responsibilities.     Plan:   Continue with bilateral knee and lower extremity flexibility, strengthening and proprioception exercises progressing as tolerated to decrease retropatellar symptoms with daily and work activities.     Patient scheduled for a reassessment on 07/15/2024.  Patient RTD as needed.     Current Problem  1. Osteoarthritis of patellofemoral joints, bilateral  Follow Up In Physical Therapy            Subjective    Patient reports continues to experience right retropatellar symptoms more so than left knee with daily and work activities.     Precautions  Precautions  Precautions Comment: None    Pain  Pain Assessment  Pain Assessment: 0-10  0-10 (Numeric) Pain Score:  (1-2/10)  Pain Location: Knee  Pain Orientation: Right, Left, Anterior  Pain Descriptors: Aching, Tightness    Objective   Added and increased exercises. See exercise log for specifics.     Hamstring flexibility  Right = 70 degrees  Left = 73 degrees    Outcome Measures:  Other Measures  Lower Extremity Funtional Score (LEFS): 77 (Score at initial evaluation)    Treatments:  EXERCISES Date 6/10/24 Date 24 Date  2024 Date    REPS REPS REPS REPS   SLR 5\"H X 20      SLR with ER 5\"H X 20      Nustep   L5 5 mins Airdyne  5 minutes    Seated hamstring stretch 30\" X 3  30\"H x 3 each    Hip flexor stretch on step next 30\" X 3 30\"H x 3 each         " "  Shuttle DLP 8B  3 X 10 8B  3 X 10 8B 3 x 15    Shuttle DTR 8B  3 X 10 8B  3 X 10 8B 3 x 15    Shuttle SLP 6B  3 X 10 6B  3 X 10 6B 3 x 15           Multi hip flexion   60# x 20    Multi hip abduction   60# x 20    Closed chain TKE w/5\"H   130# x 20           Bilateral hamstring curls   50# 3 x 15           Sidestep with band  Black 1 lap ea Black x 1 each           Wall squat  5\"H X 10     SLS ball toss 3 way   4# x 20 each           Hoist pulls F/Rev   25# x 10 each    Hoist pulls lateral L/R   25# x 10 each                                                                                 HEP           OP EDUCATION:       Goals:  1.Improve hamstring flexibility by 15 degrees bilat-4 weeks   07/02/2024 progressing  2.Improve bilat knee extension strength to 5/5 bilat-4 weeks  3.Pt will demonstrate 90% teach back with good technique of HEP-4 weeks  "

## 2024-07-09 ENCOUNTER — TREATMENT (OUTPATIENT)
Dept: PHYSICAL THERAPY | Facility: CLINIC | Age: 34
End: 2024-07-09
Payer: COMMERCIAL

## 2024-07-09 DIAGNOSIS — M17.0 OSTEOARTHRITIS OF PATELLOFEMORAL JOINTS, BILATERAL: ICD-10-CM

## 2024-07-09 PROCEDURE — 97110 THERAPEUTIC EXERCISES: CPT | Mod: GP,CQ

## 2024-07-09 ASSESSMENT — PAIN DESCRIPTION - DESCRIPTORS: DESCRIPTORS: TIGHTNESS

## 2024-07-09 ASSESSMENT — PAIN SCALES - GENERAL: PAINLEVEL_OUTOF10: 0 - NO PAIN

## 2024-07-09 ASSESSMENT — PAIN - FUNCTIONAL ASSESSMENT: PAIN_FUNCTIONAL_ASSESSMENT: 0-10

## 2024-07-09 NOTE — PROGRESS NOTES
"Physical Therapy    Physical Therapy Treatment    Patient Name: Chetan Nam  MRN: [id number]  :1990  Today's Date: 2024  Time Calculation  Start Time: 0800  Stop Time: 0850  Time Calculation (min): 50 min  PT Therapeutic Procedures Time Entry  Therapeutic Exercise Time Entry: 50            Visit:  4  Visit limit: 40 visits per year    Assessment:   Pt completed his exercise program without c/o increased knee pain. Discussed wearing knee pads when the pt is on his knees for prolonged periods of time.     Plan:   Continue with bilateral knee and lower extremity flexibility, strengthening and proprioception exercises progressing as tolerated to decrease retropatellar symptoms with daily and work activities.     Patient scheduled for a reassessment on 07/15/2024.  Patient RTD as needed.     Current Problem  1. Osteoarthritis of patellofemoral joints, bilateral  Follow Up In Physical Therapy            Subjective    Pt reports that he has \"all over soreness\" today. His knees are feeling ok. No pain this morning.    Precautions   Precautions  Precautions Comment: None    Pain  Pain Assessment  Pain Assessment: 0-10  0-10 (Numeric) Pain Score: 0 - No pain  Pain Location: Knee  Pain Orientation: Right, Left  Pain Descriptors: Tightness    Objective   R/L knee extension 4+/5  R/L knee flexion 5/5    Outcome Measures:   Not today    Treatments:  EXERCISES Date 6/10/24 Date 24 Date  2024 Date 24    REPS REPS REPS REPS   SLR 5\"H X 20      SLR with ER 5\"H X 20      Nustep   L5 5 mins Airdyne  5 minutes Nu step L5 5 min   Seated hamstring stretch 30\" X 3  30\"H x 3 each    Hip flexor stretch on step next 30\" X 3 30\"H x 3 each           Shuttle DLP 8B  3 X 10 8B  3 X 10 8B 3 x 15 8B 3x15   Shuttle DTR 8B  3 X 10 8B  3 X 10 8B 3 x 15 8B 3x15   Shuttle SLP 6B  3 X 10 6B  3 X 10 6B 3 x 15 6B 3x15          Multi hip flexion   60# x 20 60# 20x   Multi hip abduction   60# x 20 60# 20x   Closed chain TKE " "w/5\"H   130# x 20 130# 20x          Bilateral hamstring curls   50# 3 x 15 50# 3x15          Sidestep with band  Black 1 lap ea Black x 1 each Black 1x each          Wall squat  5\"H X 10     SLS ball toss 3 way   4# x 20 each 4# 20x ea          Hoist pulls F/Rev   25# x 10 each 30# 10x ea   Hoist pulls lateral L/R   25# x 10 each 30# 10x ea                                                                                HEP           OP EDUCATION:       Goals:  1.Improve hamstring flexibility by 15 degrees bilat-4 weeks   07/02/2024 progressing  2.Improve bilat knee extension strength to 5/5 bilat-4 weeks  3.Pt will demonstrate 90% teach back with good technique of HEP-4 weeks  "

## 2024-07-15 ENCOUNTER — TREATMENT (OUTPATIENT)
Dept: PHYSICAL THERAPY | Facility: CLINIC | Age: 34
End: 2024-07-15
Payer: COMMERCIAL

## 2024-07-15 DIAGNOSIS — M17.0 OSTEOARTHRITIS OF PATELLOFEMORAL JOINTS, BILATERAL: ICD-10-CM

## 2024-07-15 PROCEDURE — 97110 THERAPEUTIC EXERCISES: CPT | Mod: GP | Performed by: PHYSICAL THERAPIST

## 2024-07-15 ASSESSMENT — PAIN - FUNCTIONAL ASSESSMENT: PAIN_FUNCTIONAL_ASSESSMENT: 0-10

## 2024-07-15 ASSESSMENT — PAIN SCALES - GENERAL: PAINLEVEL_OUTOF10: 1

## 2024-07-15 NOTE — PROGRESS NOTES
"Physical Therapy    Physical Therapy Treatment/Discharge    Patient Name: Chetan Nam  MRN: [id number]  :1990  Today's Date: 7/15/2024  Time Calculation  Start Time: 09  Stop Time: 924  Time Calculation (min): 24 min  PT Therapeutic Procedures Time Entry  Therapeutic Exercise Time Entry: 24            Visit:  5  Visit limit: 40 visits per year    Assessment:   Overall he is doing very well. Pt does not consistently perform HEP as he works up to 15 hours/day. Knee pain has improved significantly since injections and PT. Pt is ready for discharge to independent HEP.    Plan:   Discharge to independent HEP.    Current Problem  1. Osteoarthritis of patellofemoral joints, bilateral  Follow Up In Physical Therapy            Subjective    Pt reports that he intermittent knee pain every couple days. Pain is low, typically 1/10.  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 Pt reports he has not been consistently performing his HEP due to being so busy.    Precautions  Precautions  Precautions Comment: nonePrecautions  Precautions Comment: None    Pain  Pain Assessment  Pain Assessment: 0-10  0-10 (Numeric) Pain Score: 1  Pain Location: Knee  Pain Orientation: Right, Left    Objective   R/L knee extension 5/5  R/L knee flexion 5/5    Hamstring flexibility:  Right: 70 degrees  Left: 85 degrees    Knee AROM is WNL bilat.    Outcome Measures:  Other Measures  Lower Extremity Funtional Score (LEFS): 79Not today    Treatments:  EXERCISES Date 6/10/24 Date 24 Date  2024 Date 7/9/24  7/15/24    REPS REPS REPS REPS    SLR 5\"H X 20       SLR with ER 5\"H X 20       Nustep   L5 5 mins Airdyne  5 minutes Nu step L5 5 min    Seated hamstring stretch 30\" X 3  30\"H x 3 each  30\" X 3   Hip flexor stretch on step next 30\" X 3 30\"H x 3 each     4 way hip with band     Grn 20X   Shuttle DLP 8B  3 X 10 8B  3 X 10 8B 3 x 15 8B 3x15    Shuttle DTR 8B  3 X 10 8B  3 X 10 8B 3 x 15 8B 3x15  " "  Shuttle SLP 6B  3 X 10 6B  3 X 10 6B 3 x 15 6B 3x15            Multi hip flexion   60# x 20 60# 20x    Multi hip abduction   60# x 20 60# 20x    Closed chain TKE w/5\"H   130# x 20 130# 20x            Bilateral hamstring curls   50# 3 x 15 50# 3x15            Sidestep with band  Black 1 lap ea Black x 1 each Black 1x each            Wall squat  5\"H X 10      SLS ball toss 3 way   4# x 20 each 4# 20x ea            Hoist pulls F/Rev   25# x 10 each 30# 10x ea    Hoist pulls lateral L/R   25# x 10 each 30# 10x ea                                                                                            HEP            OP EDUCATION:   Access Code: [email or web address]  URL: [email or web address]  Date: 07/15/2024  Prepared by: Leonela Diaz    Exercises  - Standing 3-Way Leg Reach with Resistance at Ankles and Counter Support  - 1 x daily - 7 x weekly - 2 sets - 10 reps    Goals:  1.Improve hamstring flexibility by 15 degrees bilat-4 weeks -goal partially met  2.Improve bilat knee extension strength to 5/5 bilat-4 weeks-goal met  3.Pt will demonstrate 90% teach back with good technique of HEP-4 weeks-goal met  "

## 2025-02-19 ENCOUNTER — APPOINTMENT (OUTPATIENT)
Facility: CLINIC | Age: 35
End: 2025-02-19
Payer: COMMERCIAL

## 2025-02-19 VITALS
WEIGHT: 220 LBS | HEIGHT: 74 IN | BODY MASS INDEX: 28.23 KG/M2 | DIASTOLIC BLOOD PRESSURE: 84 MMHG | SYSTOLIC BLOOD PRESSURE: 143 MMHG | HEART RATE: 92 BPM

## 2025-02-19 DIAGNOSIS — R14.3 FLATULENCE: ICD-10-CM

## 2025-02-19 DIAGNOSIS — R19.5 LOOSE STOOLS: Primary | ICD-10-CM

## 2025-02-19 PROCEDURE — 3008F BODY MASS INDEX DOCD: CPT | Performed by: INTERNAL MEDICINE

## 2025-02-19 PROCEDURE — 99204 OFFICE O/P NEW MOD 45 MIN: CPT | Performed by: INTERNAL MEDICINE

## 2025-02-19 PROCEDURE — 1036F TOBACCO NON-USER: CPT | Performed by: INTERNAL MEDICINE

## 2025-02-19 RX ORDER — POLYETHYLENE GLYCOL 3350, SODIUM SULFATE ANHYDROUS, SODIUM BICARBONATE, SODIUM CHLORIDE, POTASSIUM CHLORIDE 236; 22.74; 6.74; 5.86; 2.97 G/4L; G/4L; G/4L; G/4L; G/4L
4000 POWDER, FOR SOLUTION ORAL ONCE
Qty: 4000 ML | Refills: 0 | Status: SHIPPED | OUTPATIENT
Start: 2025-02-19 | End: 2025-02-19

## 2025-02-19 RX ORDER — DICYCLOMINE HYDROCHLORIDE 10 MG/1
10 CAPSULE ORAL 4 TIMES DAILY PRN
Qty: 120 CAPSULE | Refills: 3 | Status: SHIPPED | OUTPATIENT
Start: 2025-02-19

## 2025-02-19 NOTE — ASSESSMENT & PLAN NOTE
IBS D suspected.  Rule out IBD. Doubt EPI.  Orders:    polyethylene glycol (GoLYTELY) 236-22.74-6.74 -5.86 gram solution; Take 4,000 mL by mouth 1 time for 1 dose. Take as directed in instructions from office.    Deamidated Gliadin Antibody IgA; Future    IgA; Future    Colonoscopy Diagnostic; Future    dicyclomine (Bentyl) 10 mg capsule; Take 1 capsule (10 mg) by mouth 4 times a day as needed (abdominal pain/cramping).

## 2025-02-19 NOTE — PROGRESS NOTES
[facility]    ASSESSMENT and PLAN:            Assessment & Plan  Loose stools  IBS D suspected.  Rule out IBD. Doubt EPI.  Orders:    polyethylene glycol (GoLYTELY) 236-22.74-6.74 -5.86 gram solution; Take 4,000 mL by mouth 1 time for 1 dose. Take as directed in instructions from office.    Deamidated Gliadin Antibody IgA; Future    IgA; Future    Colonoscopy Diagnostic; Future    dicyclomine (Bentyl) 10 mg capsule; Take 1 capsule (10 mg) by mouth 4 times a day as needed (abdominal pain/cramping).    Flatulence                John Alexander MD    Gastroenterology  [facility]            Subjective   HISTORY OF PRESENT ILLNESS:     Chief Complaint  New Patient Visit (Urgent bowel movements,  family history crohns and UC  - no prior scopes )    History Of Present Illness:    Chetan Nam is a 34 y.o. male with a significant past medical history of Chronic diarrhea who presents for consultation requested by his primary care provider (Skylar Joy, APRN-DANICA) for the evaluation of life time of loose stools.   Fam hx of IBD.   Thorough testing so far negative including fecal calprotectin and inflammatory markers.  No signif ETOH, NSAIDs. Tylenol for arthritis of knee.  Patient denies any heartburn/GERD, N/V, dysphagia, odynophagia, constipation, hematemesis, hematochezia, melena, or recent weight loss.  Chronic fatigue and malaise. No hx of depression or anxiety  Endoscopy History:    None    Review of systems:   Review of Systems    I performed a complete 10 point review of systems and it is negative except as noted in HPI or above.      PAST HISTORIES:       Past Medical History:  Patient Active Problem List   Diagnosis    Preventative health care    Acute left-sided low back pain without sciatica    Loose stools    Flatulence    RUQ abdominal pain    Chronic fatigue    Arthralgia of both hands    Primary osteoarthritis involving multiple  "joints     He has no past medical history on file.    Past Surgical History:  He has a past surgical history that includes [place] tooth extraction.      Social History:  He reports that he has never smoked. He has never used smokeless tobacco. He reports current alcohol use. He reports that he does not use drugs.    Family History:  No known family history of GI disease, specifically denies any family history of pancreatitis, Crohn's, colon cancer, gastroesophageal cancer, or ulcerative colitis.    Family History   Problem Relation Name Age of Onset    Crohn's disease Mother      Alcohol abuse Father      Ulcerative colitis Brother      Alcohol abuse Brother      Colon cancer Mother's Brother      Ulcerative colitis Mother's Brother      Diverticulitis Mother's Brother      Alcohol abuse Father's Brother      Coronary artery disease Maternal Grandmother          Allergies:  Patient has no known allergies.      Objective   OBJECTIVE:       Last Recorded Vitals:  Vitals:    02/19/25 0801   BP: 143/84   Pulse: 92   Weight: 99.8 kg (220 lb)   Height: 1.88 m (6' 2\")     /84   Pulse 92   Ht 1.88 m (6' 2\")   Wt 99.8 kg (220 lb)   BMI 28.25 kg/m²      Physical Exam:    Physical Exam  Constitutional:       General: He is awake.      Appearance: Normal appearance.   HENT:      Head: Normocephalic and atraumatic.      Nose: Nose normal.      Mouth/Throat:      Mouth: Mucous membranes are moist.   Eyes:      Extraocular Movements: Extraocular movements intact.      Conjunctiva/sclera: Conjunctivae normal.   Neck:      Thyroid: No thyroid mass.      Trachea: Phonation normal.   Cardiovascular:      Rate and Rhythm: Normal rate and regular rhythm.      Heart sounds: Normal heart sounds. No murmur heard.     No gallop.   Pulmonary:      Effort: Pulmonary effort is normal. No respiratory distress.      Breath sounds: Normal air entry. No decreased breath sounds, wheezing, rhonchi or rales.   Abdominal:      General: Bowel " sounds are normal. There is no distension.      Palpations: Abdomen is soft.      Tenderness: There is no abdominal tenderness.   Musculoskeletal:         General: Normal range of motion.      Cervical back: Neck supple.      Right lower leg: No edema.      Left lower leg: No edema.   Skin:     General: Skin is warm and dry.      Coloration: Skin is not jaundiced.   Neurological:      Mental Status: He is alert and oriented to person, place, and time. Mental status is at baseline.      Cranial Nerves: Cranial nerves 2-12 are intact.      Motor: Motor function is intact.   Psychiatric:         Attention and Perception: Attention and perception normal.         Mood and Affect: Mood normal.         Speech: Speech normal.         Behavior: Behavior normal.         Home Medications:  Prior to Admission medications    Medication Sig Start Date End Date Taking? Authorizing Provider   ascorbic acid (Vitamin C) 100 mg tablet Take 1 tablet (100 mg) by mouth once daily.    Historical Provider, MD   ergocalciferol, vitamin D2, (VITAMIN D2 ORAL) Take by mouth.    Historical Provider, MD   polyethylene glycol (GoLYTELY) 236-22.74-6.74 -5.86 gram solution Take 4,000 mL by mouth 1 time for 1 dose. Take as directed in instructions from office. 2/19/25 2/19/25  John Alexander MD         Relevant Results Recent labs reviewed in the EMR.    Lab Results   Component Value Date/Time    WBC 6.0 05/01/2024 0801    HGB 15.6 05/01/2024 0801    HGB 16.1 06/21/2023 0724    MCV 85 05/01/2024 0801     05/01/2024 0801     06/21/2023 0724    IRON 138 05/01/2024 0801    TIBC 313 05/01/2024 0801    IRONSAT 44 05/01/2024 0801    FERRITIN 315 (H) 05/01/2024 0801    [id number] 317 05/01/2024 0801       Lab Results   Component Value Date/Time     05/01/2024 0801    K 4.0 05/01/2024 0801     05/01/2024 0801    BUN 19 05/01/2024 0801    CREATININE 0.89 05/01/2024 0801    CREATININE 0.90 06/21/2023 0724       Lab Results    Component Value Date/Time    BILITOT 1.0 05/01/2024 0801    BILITOT 0.7 06/21/2023 0724    ALKPHOS 63 05/01/2024 0801    ALKPHOS 74 06/21/2023 0724    AST 15 05/01/2024 0801    AST 16 06/21/2023 0724    ALT 20 05/01/2024 0801    ALT 23 06/21/2023 0724         Radiology: Imaging reviewed in the EMR.  No results found.

## 2025-02-19 NOTE — H&P (VIEW-ONLY)
[facility]    ASSESSMENT and PLAN:            Assessment & Plan  Loose stools  IBS D suspected.  Rule out IBD. Doubt EPI.  Orders:    polyethylene glycol (GoLYTELY) 236-22.74-6.74 -5.86 gram solution; Take 4,000 mL by mouth 1 time for 1 dose. Take as directed in instructions from office.    Deamidated Gliadin Antibody IgA; Future    IgA; Future    Colonoscopy Diagnostic; Future    dicyclomine (Bentyl) 10 mg capsule; Take 1 capsule (10 mg) by mouth 4 times a day as needed (abdominal pain/cramping).    Flatulence                John Alexander MD    Gastroenterology  [facility]            Subjective   HISTORY OF PRESENT ILLNESS:     Chief Complaint  New Patient Visit (Urgent bowel movements,  family history crohns and UC  - no prior scopes )    History Of Present Illness:    Chetan Nam is a 34 y.o. male with a significant past medical history of Chronic diarrhea who presents for consultation requested by his primary care provider (Skylar Joy, APRN-DANICA) for the evaluation of life time of loose stools.   Fam hx of IBD.   Thorough testing so far negative including fecal calprotectin and inflammatory markers.  No signif ETOH, NSAIDs. Tylenol for arthritis of knee.  Patient denies any heartburn/GERD, N/V, dysphagia, odynophagia, constipation, hematemesis, hematochezia, melena, or recent weight loss.  Chronic fatigue and malaise. No hx of depression or anxiety  Endoscopy History:    None    Review of systems:   Review of Systems    I performed a complete 10 point review of systems and it is negative except as noted in HPI or above.      PAST HISTORIES:       Past Medical History:  Patient Active Problem List   Diagnosis    Preventative health care    Acute left-sided low back pain without sciatica    Loose stools    Flatulence    RUQ abdominal pain    Chronic fatigue    Arthralgia of both hands    Primary osteoarthritis involving multiple  "joints     He has no past medical history on file.    Past Surgical History:  He has a past surgical history that includes [place] tooth extraction.      Social History:  He reports that he has never smoked. He has never used smokeless tobacco. He reports current alcohol use. He reports that he does not use drugs.    Family History:  No known family history of GI disease, specifically denies any family history of pancreatitis, Crohn's, colon cancer, gastroesophageal cancer, or ulcerative colitis.    Family History   Problem Relation Name Age of Onset    Crohn's disease Mother      Alcohol abuse Father      Ulcerative colitis Brother      Alcohol abuse Brother      Colon cancer Mother's Brother      Ulcerative colitis Mother's Brother      Diverticulitis Mother's Brother      Alcohol abuse Father's Brother      Coronary artery disease Maternal Grandmother          Allergies:  Patient has no known allergies.      Objective   OBJECTIVE:       Last Recorded Vitals:  Vitals:    02/19/25 0801   BP: 143/84   Pulse: 92   Weight: 99.8 kg (220 lb)   Height: 1.88 m (6' 2\")     /84   Pulse 92   Ht 1.88 m (6' 2\")   Wt 99.8 kg (220 lb)   BMI 28.25 kg/m²      Physical Exam:    Physical Exam  Constitutional:       General: He is awake.      Appearance: Normal appearance.   HENT:      Head: Normocephalic and atraumatic.      Nose: Nose normal.      Mouth/Throat:      Mouth: Mucous membranes are moist.   Eyes:      Extraocular Movements: Extraocular movements intact.      Conjunctiva/sclera: Conjunctivae normal.   Neck:      Thyroid: No thyroid mass.      Trachea: Phonation normal.   Cardiovascular:      Rate and Rhythm: Normal rate and regular rhythm.      Heart sounds: Normal heart sounds. No murmur heard.     No gallop.   Pulmonary:      Effort: Pulmonary effort is normal. No respiratory distress.      Breath sounds: Normal air entry. No decreased breath sounds, wheezing, rhonchi or rales.   Abdominal:      General: Bowel " sounds are normal. There is no distension.      Palpations: Abdomen is soft.      Tenderness: There is no abdominal tenderness.   Musculoskeletal:         General: Normal range of motion.      Cervical back: Neck supple.      Right lower leg: No edema.      Left lower leg: No edema.   Skin:     General: Skin is warm and dry.      Coloration: Skin is not jaundiced.   Neurological:      Mental Status: He is alert and oriented to person, place, and time. Mental status is at baseline.      Cranial Nerves: Cranial nerves 2-12 are intact.      Motor: Motor function is intact.   Psychiatric:         Attention and Perception: Attention and perception normal.         Mood and Affect: Mood normal.         Speech: Speech normal.         Behavior: Behavior normal.         Home Medications:  Prior to Admission medications    Medication Sig Start Date End Date Taking? Authorizing Provider   ascorbic acid (Vitamin C) 100 mg tablet Take 1 tablet (100 mg) by mouth once daily.    Historical Provider, MD   ergocalciferol, vitamin D2, (VITAMIN D2 ORAL) Take by mouth.    Historical Provider, MD   polyethylene glycol (GoLYTELY) 236-22.74-6.74 -5.86 gram solution Take 4,000 mL by mouth 1 time for 1 dose. Take as directed in instructions from office. 2/19/25 2/19/25  John Alexander MD         Relevant Results Recent labs reviewed in the EMR.    Lab Results   Component Value Date/Time    WBC 6.0 05/01/2024 0801    HGB 15.6 05/01/2024 0801    HGB 16.1 06/21/2023 0724    MCV 85 05/01/2024 0801     05/01/2024 0801     06/21/2023 0724    IRON 138 05/01/2024 0801    TIBC 313 05/01/2024 0801    IRONSAT 44 05/01/2024 0801    FERRITIN 315 (H) 05/01/2024 0801    EGALRKTV00 317 05/01/2024 0801       Lab Results   Component Value Date/Time     05/01/2024 0801    K 4.0 05/01/2024 0801     05/01/2024 0801    BUN 19 05/01/2024 0801    CREATININE 0.89 05/01/2024 0801    CREATININE 0.90 06/21/2023 0724       Lab Results    Component Value Date/Time    BILITOT 1.0 05/01/2024 0801    BILITOT 0.7 06/21/2023 0724    ALKPHOS 63 05/01/2024 0801    ALKPHOS 74 06/21/2023 0724    AST 15 05/01/2024 0801    AST 16 06/21/2023 0724    ALT 20 05/01/2024 0801    ALT 23 06/21/2023 0724         Radiology: Imaging reviewed in the EMR.  No results found.

## 2025-02-21 LAB
GLIADIN IGA SER IA-ACNC: <1 U/ML
IGA SERPL-MCNC: 114 MG/DL (ref 47–310)

## 2025-02-26 ENCOUNTER — TELEPHONE (OUTPATIENT)
Facility: CLINIC | Age: 35
End: 2025-02-26
Payer: COMMERCIAL

## 2025-02-26 NOTE — TELEPHONE ENCOUNTER
----- Message from [facility] sent at 2/21/2025  9:17 AM EST -----  No evidence of celiac sprue.

## 2025-02-27 ENCOUNTER — HOSPITAL ENCOUNTER (OUTPATIENT)
Dept: NEUROLOGY | Facility: HOSPITAL | Age: 35
Discharge: HOME | End: 2025-02-27
Payer: COMMERCIAL

## 2025-02-27 DIAGNOSIS — G56.03 BILATERAL CARPAL TUNNEL SYNDROME: ICD-10-CM

## 2025-02-27 PROCEDURE — 95886 MUSC TEST DONE W/N TEST COMP: CPT | Performed by: PSYCHIATRY & NEUROLOGY

## 2025-02-27 PROCEDURE — 95910 NRV CNDJ TEST 7-8 STUDIES: CPT | Performed by: PSYCHIATRY & NEUROLOGY

## 2025-02-28 ENCOUNTER — TELEPHONE (OUTPATIENT)
Facility: CLINIC | Age: 35
End: 2025-02-28
Payer: COMMERCIAL

## 2025-03-06 ENCOUNTER — ANESTHESIA EVENT (OUTPATIENT)
Dept: GASTROENTEROLOGY | Facility: HOSPITAL | Age: 35
End: 2025-03-06
Payer: COMMERCIAL

## 2025-03-06 ENCOUNTER — ANESTHESIA (OUTPATIENT)
Dept: GASTROENTEROLOGY | Facility: HOSPITAL | Age: 35
End: 2025-03-06
Payer: COMMERCIAL

## 2025-03-06 ENCOUNTER — HOSPITAL ENCOUNTER (OUTPATIENT)
Dept: GASTROENTEROLOGY | Facility: HOSPITAL | Age: 35
Discharge: HOME | End: 2025-03-06
Payer: COMMERCIAL

## 2025-03-06 VITALS
WEIGHT: 210 LBS | BODY MASS INDEX: 26.95 KG/M2 | DIASTOLIC BLOOD PRESSURE: 81 MMHG | HEART RATE: 77 BPM | TEMPERATURE: 98 F | HEIGHT: 74 IN | OXYGEN SATURATION: 99 % | SYSTOLIC BLOOD PRESSURE: 118 MMHG | RESPIRATION RATE: 16 BRPM

## 2025-03-06 DIAGNOSIS — R19.5 LOOSE STOOLS: ICD-10-CM

## 2025-03-06 PROCEDURE — 7100000009 HC PHASE TWO TIME - INITIAL BASE CHARGE

## 2025-03-06 PROCEDURE — 45380 COLONOSCOPY AND BIOPSY: CPT | Performed by: INTERNAL MEDICINE

## 2025-03-06 PROCEDURE — 3700000002 HC GENERAL ANESTHESIA TIME - EACH INCREMENTAL 1 MINUTE

## 2025-03-06 PROCEDURE — 3700000001 HC GENERAL ANESTHESIA TIME - INITIAL BASE CHARGE

## 2025-03-06 PROCEDURE — 2500000004 HC RX 250 GENERAL PHARMACY W/ HCPCS (ALT 636 FOR OP/ED): Performed by: NURSE ANESTHETIST, CERTIFIED REGISTERED

## 2025-03-06 PROCEDURE — 7100000010 HC PHASE TWO TIME - EACH INCREMENTAL 1 MINUTE

## 2025-03-06 RX ORDER — LIDOCAINE HYDROCHLORIDE 20 MG/ML
INJECTION, SOLUTION EPIDURAL; INFILTRATION; INTRACAUDAL; PERINEURAL AS NEEDED
Status: DISCONTINUED | OUTPATIENT
Start: 2025-03-06 | End: 2025-03-06

## 2025-03-06 RX ORDER — SODIUM CHLORIDE 0.9 % (FLUSH) 0.9 %
SYRINGE (ML) INJECTION AS NEEDED
Status: DISCONTINUED | OUTPATIENT
Start: 2025-03-06 | End: 2025-03-06

## 2025-03-06 RX ORDER — PROPOFOL 10 MG/ML
INJECTION, EMULSION INTRAVENOUS AS NEEDED
Status: DISCONTINUED | OUTPATIENT
Start: 2025-03-06 | End: 2025-03-06

## 2025-03-06 RX ADMIN — PROPOFOL 20 MG: 10 INJECTION, EMULSION INTRAVENOUS at 15:09

## 2025-03-06 RX ADMIN — PROPOFOL 80 MG: 10 INJECTION, EMULSION INTRAVENOUS at 15:05

## 2025-03-06 RX ADMIN — PROPOFOL 40 MG: 10 INJECTION, EMULSION INTRAVENOUS at 15:19

## 2025-03-06 RX ADMIN — PROPOFOL 30 MG: 10 INJECTION, EMULSION INTRAVENOUS at 15:10

## 2025-03-06 RX ADMIN — Medication 10 ML: at 15:20

## 2025-03-06 RX ADMIN — PROPOFOL 50 MG: 10 INJECTION, EMULSION INTRAVENOUS at 15:12

## 2025-03-06 RX ADMIN — PROPOFOL 50 MG: 10 INJECTION, EMULSION INTRAVENOUS at 15:07

## 2025-03-06 RX ADMIN — LIDOCAINE HYDROCHLORIDE 60 MG: 20 INJECTION, SOLUTION EPIDURAL; INFILTRATION; INTRACAUDAL; PERINEURAL at 15:05

## 2025-03-06 RX ADMIN — PROPOFOL 40 MG: 10 INJECTION, EMULSION INTRAVENOUS at 15:16

## 2025-03-06 RX ADMIN — PROPOFOL 50 MG: 10 INJECTION, EMULSION INTRAVENOUS at 15:14

## 2025-03-06 SDOH — HEALTH STABILITY: MENTAL HEALTH: CURRENT SMOKER: 0

## 2025-03-06 ASSESSMENT — PAIN SCALES - GENERAL
PAINLEVEL_OUTOF10: 0 - NO PAIN
PAINLEVEL_OUTOF10: 0 - NO PAIN
PAIN_LEVEL: 0
PAINLEVEL_OUTOF10: 0 - NO PAIN
PAINLEVEL_OUTOF10: 0 - NO PAIN

## 2025-03-06 ASSESSMENT — COLUMBIA-SUICIDE SEVERITY RATING SCALE - C-SSRS
1. IN THE PAST MONTH, HAVE YOU WISHED YOU WERE DEAD OR WISHED YOU COULD GO TO SLEEP AND NOT WAKE UP?: NO
6. HAVE YOU EVER DONE ANYTHING, STARTED TO DO ANYTHING, OR PREPARED TO DO ANYTHING TO END YOUR LIFE?: NO
2. HAVE YOU ACTUALLY HAD ANY THOUGHTS OF KILLING YOURSELF?: NO

## 2025-03-06 ASSESSMENT — PAIN - FUNCTIONAL ASSESSMENT
PAIN_FUNCTIONAL_ASSESSMENT: 0-10

## 2025-03-06 NOTE — ANESTHESIA POSTPROCEDURE EVALUATION
Patient: Chetan Nam    Procedure Summary       Date: 03/06/25 Room / Location: [facility]    Anesthesia Start: 1503 Anesthesia Stop: 1527    Procedure: COLONOSCOPY Diagnosis: Loose stools    Scheduled Providers: John Alexander MD Responsible Provider: NICKY Ellis    Anesthesia Type: MAC ASA Status: 2            Anesthesia Type: MAC    Vitals Value Taken Time   /59 03/06/25 1533   Temp 36.8 °C (98.3 °F) 03/06/25 1523   Pulse 73 03/06/25 1533   Resp 16 03/06/25 1533   SpO2 99 % 03/06/25 1533       Anesthesia Post Evaluation    Patient location during evaluation: PACU  Patient participation: complete - patient participated  Level of consciousness: awake and awake and alert  Pain score: 0  Pain management: adequate  Airway patency: patent  Cardiovascular status: acceptable and hemodynamically stable  Respiratory status: acceptable, room air and spontaneous ventilation  Hydration status: acceptable  Postoperative Nausea and Vomiting: none        There were no known notable events for this encounter.

## 2025-03-14 LAB
LABORATORY COMMENT REPORT: NORMAL
PATH REPORT.FINAL DX SPEC: NORMAL
PATH REPORT.GROSS SPEC: NORMAL
PATH REPORT.RELEVANT HX SPEC: NORMAL
PATH REPORT.TOTAL CANCER: NORMAL

## 2025-05-10 LAB
ALBUMIN SERPL-MCNC: 4.7 G/DL (ref 3.6–5.1)
ALP SERPL-CCNC: 68 U/L (ref 36–130)
ALT SERPL-CCNC: 24 U/L (ref 9–46)
ANION GAP SERPL CALCULATED.4IONS-SCNC: 10 MMOL/L (CALC) (ref 7–17)
AST SERPL-CCNC: 18 U/L (ref 10–40)
BASOPHILS # BLD AUTO: 18 CELLS/UL (ref 0–200)
BASOPHILS NFR BLD AUTO: 0.3 %
BILIRUB SERPL-MCNC: 0.7 MG/DL (ref 0.2–1.2)
BUN SERPL-MCNC: 20 MG/DL (ref 7–25)
CALCIUM SERPL-MCNC: 8.8 MG/DL (ref 8.6–10.3)
CHLORIDE SERPL-SCNC: 104 MMOL/L (ref 98–110)
CHOLEST SERPL-MCNC: 141 MG/DL
CHOLEST/HDLC SERPL: 3.1 (CALC)
CO2 SERPL-SCNC: 27 MMOL/L (ref 20–32)
CREAT SERPL-MCNC: 0.76 MG/DL (ref 0.6–1.26)
EGFRCR SERPLBLD CKD-EPI 2021: 120 ML/MIN/1.73M2
EOSINOPHIL # BLD AUTO: 150 CELLS/UL (ref 15–500)
EOSINOPHIL NFR BLD AUTO: 2.5 %
ERYTHROCYTE [DISTWIDTH] IN BLOOD BY AUTOMATED COUNT: 12.8 % (ref 11–15)
GLUCOSE SERPL-MCNC: 97 MG/DL (ref 65–99)
HCT VFR BLD AUTO: 46.3 % (ref 38.5–50)
HDLC SERPL-MCNC: 45 MG/DL
HGB BLD-MCNC: 15.8 G/DL (ref 13.2–17.1)
LDLC SERPL CALC-MCNC: 83 MG/DL (CALC)
LYMPHOCYTES # BLD AUTO: 1602 CELLS/UL (ref 850–3900)
LYMPHOCYTES NFR BLD AUTO: 26.7 %
MCH RBC QN AUTO: 29.3 PG (ref 27–33)
MCHC RBC AUTO-ENTMCNC: 34.1 G/DL (ref 32–36)
MCV RBC AUTO: 85.9 FL (ref 80–100)
MONOCYTES # BLD AUTO: 390 CELLS/UL (ref 200–950)
MONOCYTES NFR BLD AUTO: 6.5 %
NEUTROPHILS # BLD AUTO: 3840 CELLS/UL (ref 1500–7800)
NEUTROPHILS NFR BLD AUTO: 64 %
NONHDLC SERPL-MCNC: 96 MG/DL (CALC)
PLATELET # BLD AUTO: 191 THOUSAND/UL (ref 140–400)
PMV BLD REES-ECKER: 9.8 FL (ref 7.5–12.5)
POTASSIUM SERPL-SCNC: 4.3 MMOL/L (ref 3.5–5.3)
PROT SERPL-MCNC: 6.4 G/DL (ref 6.1–8.1)
RBC # BLD AUTO: 5.39 MILLION/UL (ref 4.2–5.8)
SODIUM SERPL-SCNC: 141 MMOL/L (ref 135–146)
TRIGL SERPL-MCNC: 54 MG/DL
TSH SERPL-ACNC: 1.71 MIU/L (ref 0.4–4.5)
VIT B12 SERPL-MCNC: 396 PG/ML (ref 200–1100)
WBC # BLD AUTO: 6 THOUSAND/UL (ref 3.8–10.8)

## 2025-05-16 ENCOUNTER — APPOINTMENT (OUTPATIENT)
Dept: PRIMARY CARE | Facility: CLINIC | Age: 35
End: 2025-05-16
Payer: COMMERCIAL

## 2025-05-16 VITALS
WEIGHT: 223.8 LBS | OXYGEN SATURATION: 96 % | TEMPERATURE: 97.6 F | HEART RATE: 82 BPM | DIASTOLIC BLOOD PRESSURE: 77 MMHG | RESPIRATION RATE: 18 BRPM | HEIGHT: 74 IN | BODY MASS INDEX: 28.72 KG/M2 | SYSTOLIC BLOOD PRESSURE: 119 MMHG

## 2025-05-16 DIAGNOSIS — Z13.220 ENCOUNTER FOR LIPID SCREENING FOR CARDIOVASCULAR DISEASE: ICD-10-CM

## 2025-05-16 DIAGNOSIS — Z13.6 ENCOUNTER FOR LIPID SCREENING FOR CARDIOVASCULAR DISEASE: ICD-10-CM

## 2025-05-16 DIAGNOSIS — R53.82 CHRONIC FATIGUE: ICD-10-CM

## 2025-05-16 DIAGNOSIS — Z00.00 PREVENTATIVE HEALTH CARE: ICD-10-CM

## 2025-05-16 DIAGNOSIS — R00.0 RACING HEART BEAT: ICD-10-CM

## 2025-05-16 DIAGNOSIS — Z23 NEED FOR VACCINATION: Primary | ICD-10-CM

## 2025-05-16 PROCEDURE — 90471 IMMUNIZATION ADMIN: CPT | Performed by: NURSE PRACTITIONER

## 2025-05-16 PROCEDURE — 99213 OFFICE O/P EST LOW 20 MIN: CPT | Performed by: NURSE PRACTITIONER

## 2025-05-16 PROCEDURE — 99395 PREV VISIT EST AGE 18-39: CPT | Performed by: NURSE PRACTITIONER

## 2025-05-16 PROCEDURE — 3008F BODY MASS INDEX DOCD: CPT | Performed by: NURSE PRACTITIONER

## 2025-05-16 PROCEDURE — 1036F TOBACCO NON-USER: CPT | Performed by: NURSE PRACTITIONER

## 2025-05-16 PROCEDURE — 90715 TDAP VACCINE 7 YRS/> IM: CPT | Performed by: NURSE PRACTITIONER

## 2025-05-16 ASSESSMENT — ENCOUNTER SYMPTOMS
ABDOMINAL PAIN: 0
WEAKNESS: 0
NERVOUS/ANXIOUS: 0
FEVER: 0
VOMITING: 0
ACTIVITY CHANGE: 0
DIZZINESS: 0
CONSTITUTIONAL NEGATIVE: 1
FATIGUE: 0
CHILLS: 0
COUGH: 0
PALPITATIONS: 0
WHEEZING: 0
SHORTNESS OF BREATH: 0
APNEA: 0
CONFUSION: 0
HEADACHES: 0
NAUSEA: 0
RESPIRATORY NEGATIVE: 1

## 2025-05-16 ASSESSMENT — PATIENT HEALTH QUESTIONNAIRE - PHQ9
1. LITTLE INTEREST OR PLEASURE IN DOING THINGS: NOT AT ALL
SUM OF ALL RESPONSES TO PHQ9 QUESTIONS 1 AND 2: 0
2. FEELING DOWN, DEPRESSED OR HOPELESS: NOT AT ALL

## 2025-05-16 NOTE — ASSESSMENT & PLAN NOTE
Referral to cardiology.  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  No other symptoms such as chest pain, numbness or tingling.  Does feel he is short of breath because he is exerting himself.  EKG unremarkable before  Can consider Holter but he reports this does not happen all the time

## 2025-05-16 NOTE — PROGRESS NOTES
"Subjective   Patient ID: Chetan Nam is a 35 y.o. male who presents for Annual Exam, Loose Stools/ bloating , and Racing Heartbeat with exertion.    Annual examination    Loose stools: Managed by gastroenterology.  On Bentyl for abdominal pain/cramping.  Recent colonoscopy 3/6/2025. Negative biopsy  Feels a little better now. Feels bentyl alleviates   Encouraged to keep food journal       My heart is pounding more that it should when exerting myself  Denies chest pain and dizziness   \"I feel like certain things I do at certain times, Im out of breath\"  Does not happen every time.   No pattern of when it happens.  Reports he has had a negative EKG before with same complaint but at the time of the EKG he felt fine         Review of Systems   Constitutional: Negative.  Negative for activity change, chills, fatigue and fever.   Respiratory: Negative.  Negative for apnea, cough, shortness of breath and wheezing.    Cardiovascular:  Negative for chest pain and palpitations.   Gastrointestinal:  Negative for abdominal pain, nausea and vomiting.   Neurological:  Negative for dizziness, weakness and headaches.   Psychiatric/Behavioral:  Negative for confusion. The patient is not nervous/anxious.        Objective   /77   Pulse 82   Temp 36.4 °C (97.6 °F) (Temporal)   Resp 18   Ht 1.88 m (6' 2\")   Wt 102 kg (223 lb 12.8 oz)   SpO2 96%   BMI 28.73 kg/m²     Physical Exam  Vitals reviewed.   Constitutional:       Appearance: Normal appearance.   HENT:      Head: Normocephalic.   Cardiovascular:      Rate and Rhythm: Normal rate and regular rhythm.      Pulses: Normal pulses.      Heart sounds: Normal heart sounds.   Pulmonary:      Effort: Pulmonary effort is normal. No respiratory distress.      Breath sounds: Normal breath sounds. No wheezing.   Abdominal:      General: Bowel sounds are normal.   Neurological:      General: No focal deficit present.      Mental Status: He is alert and oriented to person, " place, and time.   Psychiatric:         Mood and Affect: Mood normal.         Behavior: Behavior normal.         Assessment/Plan   Problem List Items Addressed This Visit           ICD-10-CM    Preventative health care Z00.00    Health Maintenance Topics with due status: Overdue       Topic Date Due    Yearly Adult Physical Never done    HIV Screening Never done    Varicella Vaccines Never done    Hepatitis C Screening Never done    Diabetes Screening Never done    DTaP/Tdap/Td Vaccines Never done    COVID-19 Vaccine Never done    Colorectal Cancer Screening 04/05/2025     Health Maintenance Topics with due status: Not Due       Topic Last Completion Date    Lipid Panel 05/09/2025    Influenza Vaccine Not Due    Zoster Vaccines Not Due     Health Maintenance Topics with due status: Completed       Topic Last Completion Date    Hepatitis B Vaccines 05/03/2000    MMR Vaccines 07/21/2000     Health Maintenance Topics with due status: Aged Out       Topic Date Due    HIB Vaccines Aged Out    IPV Vaccines Aged Out    Hepatitis A Vaccines Aged Out    Meningococcal Vaccine Aged Out    Rotavirus Vaccines Aged Out    HPV Vaccines Aged Out    Pneumococcal Vaccine: Pediatrics and At-Risk Adult Patients Aged Out            Chronic fatigue R53.82    Labs reordered.  Unremarkable findings         Relevant Orders    Comprehensive Metabolic Panel    Vitamin B12    TSH with reflex to Free T4 if abnormal    Vitamin D 1,25 Dihydroxy (for eval of hypercalcemia)    CBC and Auto Differential    Racing heart beat R00.0    Referral to cardiology.  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  No other symptoms such as chest pain, numbness or tingling.  Does feel he is short of breath because he is exerting himself.  EKG unremarkable before  Can consider Holter but he reports this does not happen all the time         Relevant Orders    Referral to Cardiology     Need for vaccination - Primary Z23    Relevant Orders    Tdap vaccine, age 7 years and older (Completed)     Other Visit Diagnoses         Codes      Encounter for lipid screening for cardiovascular disease     Z13.220, Z13.6    Relevant Orders    Lipid Panel

## 2025-05-16 NOTE — ASSESSMENT & PLAN NOTE
Health Maintenance Topics with due status: Overdue       Topic Date Due    Yearly Adult Physical Never done    HIV Screening Never done    Varicella Vaccines Never done    Hepatitis C Screening Never done    Diabetes Screening Never done    DTaP/Tdap/Td Vaccines Never done    COVID-19 Vaccine Never done    Colorectal Cancer Screening 04/05/2025     Health Maintenance Topics with due status: Not Due       Topic Last Completion Date    Lipid Panel 05/09/2025    Influenza Vaccine Not Due    Zoster Vaccines Not Due     Health Maintenance Topics with due status: Completed       Topic Last Completion Date    Hepatitis B Vaccines 05/03/2000    MMR Vaccines 07/21/2000     Health Maintenance Topics with due status: Aged Out       Topic Date Due    HIB Vaccines Aged Out    IPV Vaccines Aged Out    Hepatitis A Vaccines Aged Out    Meningococcal Vaccine Aged Out    Rotavirus Vaccines Aged Out    HPV Vaccines Aged Out    Pneumococcal Vaccine: Pediatrics and At-Risk Adult Patients Aged Out

## 2025-07-10 NOTE — PROGRESS NOTES
Referred by Dr. Joy for Tachycardia    Counseling:  The patient was counseled regarding diagnostic results, instructions for management, risk factor reductions, prognosis, patient and family education, impressions, risks and benefits of treatment options and importance of compliance with treatment.       History Of Present Illness:    Chetan Nam is a 35 y.o. male patient whose PMH is unremarkable. He presents today to establish cardiovascular care for the evaluation and management of tachycardia. The patient reports a 2 year history of progressively worsening tachycardia with light exercise/activity. He states that it feels like his heart races or pounds in his chest. He also reports that he gets fatigued more easily than usual. He denies any chest pain or discomfort. He reports occasional exertional SOB. The patient denies any dizziness, lightheadedness, presyncope or syncope. The patient denies any prior personal history of heart disease, but over the past 2 years his blood pressure has been elevated in the 140-150 systolic range, although has improved as of late. The patient's family history is positive for heart disease in his mother and father.     Past Surgical History:  He has a past surgical history that includes [place] tooth extraction.      Social History:  He reports that he has never smoked. He has never used smokeless tobacco. He reports that he does not currently use alcohol. He reports that he does not use drugs.    Family History:  Family History[1]     Allergies:  Patient has no known allergies.    Outpatient Medications:  Current Outpatient Medications   Medication Instructions    ascorbic acid (VITAMIN C) 100 mg, Daily    dicyclomine (BENTYL) 10 mg, oral, 4 times daily PRN    ergocalciferol, vitamin D2, (VITAMIN D2 ORAL) Take by mouth.        Last Recorded Vitals:  Vitals:    07/11/25 1128   BP: 120/80   BP Location: Left arm   Patient Position: Sitting   BP Cuff Size: Adult   Pulse: 74  "  SpO2: 96%   Weight: 97.5 kg (215 lb)   Height: 1.88 m (6' 2\")       Review of Systems   Constitutional: Positive for malaise/fatigue.   Cardiovascular:  Positive for dyspnea on exertion and palpitations.   All other systems reviewed and are negative.     Physical Exam:  Constitutional:       Appearance: Healthy appearance. Not in distress.   Neck:      Vascular: No JVR. JVD normal.   Pulmonary:      Effort: Pulmonary effort is normal.      Breath sounds: Normal breath sounds. No wheezing. No rhonchi. No rales.   Chest:      Chest wall: Not tender to palpatation.   Cardiovascular:      PMI at left midclavicular line. Normal rate. Regular rhythm. Normal S1. Normal S2.       Murmurs: There is no murmur.      No gallop.  No click. No rub.   Pulses:     Intact distal pulses.   Edema:     Peripheral edema absent.   Abdominal:      General: Bowel sounds are normal.      Palpations: Abdomen is soft.      Tenderness: There is no abdominal tenderness.   Musculoskeletal: Normal range of motion.         General: No tenderness. Skin:     General: Skin is warm and dry.   Neurological:      General: No focal deficit present.      Mental Status: Alert and oriented to person, place and time.        Last Labs:  CBC -  Lab Results   Component Value Date    WBC 6.0 05/09/2025    HGB 15.8 05/09/2025    HCT 46.3 05/09/2025    MCV 85.9 05/09/2025     05/09/2025       CMP -  Lab Results   Component Value Date    CALCIUM 8.8 05/09/2025    PROT 6.4 05/09/2025    ALBUMIN 4.7 05/09/2025    AST 18 05/09/2025    ALT 24 05/09/2025    ALKPHOS 68 05/09/2025    BILITOT 0.7 05/09/2025       LIPID PANEL -   Lab Results   Component Value Date    CHOL 141 05/09/2025    TRIG 54 05/09/2025    HDL 45 05/09/2025    CHHDL 3.1 05/09/2025    LDLF 70 06/21/2023    VLDL 21 05/01/2024    NHDL 96 05/09/2025       RENAL FUNCTION PANEL -   Lab Results   Component Value Date    GLUCOSE 97 05/09/2025     05/09/2025    K 4.3 05/09/2025     " "05/09/2025    CO2 27 05/09/2025    ANIONGAP 10 05/09/2025    BUN 20 05/09/2025    CREATININE 0.76 05/09/2025    GFRMALE >90 06/21/2023    CALCIUM 8.8 05/09/2025    ALBUMIN 4.7 05/09/2025        No results found for: \"BNP\", \"HGBA1C\"    Last Cardiology Tests:  N/A    Lab review: I have personally reviewed the laboratory result(s).    Assessment/Plan   1) Tachycardia   2-year h/o history of progressively worsening tachycardia with light exercise/activity  Reports easy fatigability   Denies chest pain or discomfort  Reports occasional exertional SOB  Denies dizziness, lightheadedness, presyncope or syncope  Denies any prior personal history of heart disease  BP has been elevated in 140-150 systolic range over the past 2 years, although has improved as of late  FH positive for heart disease in mother and father  Check 7-day Holter  Check exercise stress echo  F/U after testing        Scribe Attestation  By signing my name below, I, Jen Russell, attest that this documentation has been prepared under the direction and in the presence of Tone Barrera MD.        [1]   Family History  Problem Relation Name Age of Onset    Crohn's disease Mother      Alcohol abuse Father      Ulcerative colitis Brother      Alcohol abuse Brother      Colon cancer Mother's Brother      Ulcerative colitis Mother's Brother      Diverticulitis Mother's Brother      Alcohol abuse Father's Brother      Coronary artery disease Maternal Grandmother      Stroke Maternal Grandmother Chata dalton 60 - 69    Heart attack Maternal Grandfather  60 - 69    Heart failure Paternal Grandfather  80 - 99     "

## 2025-07-11 ENCOUNTER — APPOINTMENT (OUTPATIENT)
Dept: CARDIOLOGY | Facility: HOSPITAL | Age: 35
End: 2025-07-11
Payer: COMMERCIAL

## 2025-07-11 VITALS
HEART RATE: 74 BPM | BODY MASS INDEX: 27.59 KG/M2 | HEIGHT: 74 IN | DIASTOLIC BLOOD PRESSURE: 80 MMHG | WEIGHT: 215 LBS | SYSTOLIC BLOOD PRESSURE: 120 MMHG | OXYGEN SATURATION: 96 %

## 2025-07-11 DIAGNOSIS — R06.02 SHORTNESS OF BREATH: Primary | ICD-10-CM

## 2025-07-11 DIAGNOSIS — R00.0 RACING HEART BEAT: ICD-10-CM

## 2025-07-11 LAB
ATRIAL RATE: 74 BPM
P AXIS: 55 DEGREES
P OFFSET: 211 MS
P ONSET: 149 MS
PR INTERVAL: 144 MS
Q ONSET: 221 MS
QRS COUNT: 12 BEATS
QRS DURATION: 88 MS
QT INTERVAL: 374 MS
QTC CALCULATION(BAZETT): 415 MS
QTC FREDERICIA: 401 MS
R AXIS: 47 DEGREES
T AXIS: 53 DEGREES
T OFFSET: 408 MS
VENTRICULAR RATE: 74 BPM

## 2025-07-11 PROCEDURE — 99213 OFFICE O/P EST LOW 20 MIN: CPT

## 2025-07-11 PROCEDURE — 93005 ELECTROCARDIOGRAM TRACING: CPT | Performed by: INTERNAL MEDICINE

## 2025-07-11 PROCEDURE — 3008F BODY MASS INDEX DOCD: CPT | Performed by: INTERNAL MEDICINE

## 2025-07-11 PROCEDURE — 1036F TOBACCO NON-USER: CPT | Performed by: INTERNAL MEDICINE

## 2025-07-11 PROCEDURE — 99204 OFFICE O/P NEW MOD 45 MIN: CPT | Performed by: INTERNAL MEDICINE

## 2025-07-11 ASSESSMENT — ENCOUNTER SYMPTOMS
DYSPNEA ON EXERTION: 1
PALPITATIONS: 1

## 2025-07-11 NOTE — Clinical Note
July 11, 2025     MUMTAZ Espinoza, JUAN  [address] [facility] [address]  [address] [place] [id number]    Patient: Chetan Nam   YOB: 1990   Date of Visit: 7/11/2025       Dear MUMTAZ Aaron, JUAN:    Thank you for referring Chetan Nam to me for evaluation. Below are my notes for this consultation.  If you have questions, please do not hesitate to call me. I look forward to following your patient along with you.       Sincerely,     Tone Barrera MD      CC: No Recipients  ______________________________________________________________________________________    Referred by Dr. Joy for Tachycardia    Counseling:  The patient was counseled regarding diagnostic results, instructions for management, risk factor reductions, prognosis, patient and family education, impressions, risks and benefits of treatment options and importance of compliance with treatment.       History Of Present Illness:    Chetan Nam is a 35 y.o. male patient whose PMH is unremarkable. He presents today to establish cardiovascular care for the evaluation and management of tachycardia.     Past Surgical History:  He has a past surgical history that includes [place] tooth extraction.      Social History:  He reports that he has never smoked. He has never used smokeless tobacco. He reports that he does not currently use alcohol. He reports that he does not use drugs.    Family History:  Family History[1]     Allergies:  Patient has no known allergies.    Outpatient Medications:  Current Outpatient Medications   Medication Instructions    ascorbic acid (VITAMIN C) 100 mg, Daily    dicyclomine (BENTYL) 10 mg, oral, 4 times daily PRN    ergocalciferol, vitamin D2, (VITAMIN D2 ORAL) Take by mouth.        Last Recorded Vitals:  There were no vitals filed for this visit.    Review of Systems   All other systems reviewed and are negative.     Physical Exam:  Constitutional:       Appearance: Healthy appearance. Not in  "distress.   Neck:      Vascular: No JVR. JVD normal.   Pulmonary:      Effort: Pulmonary effort is normal.      Breath sounds: Normal breath sounds. No wheezing. No rhonchi. No rales.   Chest:      Chest wall: Not tender to palpatation.   Cardiovascular:      PMI at left midclavicular line. Normal rate. Regular rhythm. Normal S1. Normal S2.       Murmurs: There is no murmur.      No gallop.  No click. No rub.   Pulses:     Intact distal pulses.   Edema:     Peripheral edema absent.   Abdominal:      General: Bowel sounds are normal.      Palpations: Abdomen is soft.      Tenderness: There is no abdominal tenderness.   Musculoskeletal: Normal range of motion.         General: No tenderness. Skin:     General: Skin is warm and dry.   Neurological:      General: No focal deficit present.      Mental Status: Alert and oriented to person, place and time.        Last Labs:  CBC -  Lab Results   Component Value Date    WBC 6.0 05/09/2025    HGB 15.8 05/09/2025    HCT 46.3 05/09/2025    MCV 85.9 05/09/2025     05/09/2025       CMP -  Lab Results   Component Value Date    CALCIUM 8.8 05/09/2025    PROT 6.4 05/09/2025    ALBUMIN 4.7 05/09/2025    AST 18 05/09/2025    ALT 24 05/09/2025    ALKPHOS 68 05/09/2025    BILITOT 0.7 05/09/2025       LIPID PANEL -   Lab Results   Component Value Date    CHOL 141 05/09/2025    TRIG 54 05/09/2025    HDL 45 05/09/2025    CHHDL 3.1 05/09/2025    LDLF 70 06/21/2023    VLDL 21 05/01/2024    NHDL 96 05/09/2025       RENAL FUNCTION PANEL -   Lab Results   Component Value Date    GLUCOSE 97 05/09/2025     05/09/2025    K 4.3 05/09/2025     05/09/2025    CO2 27 05/09/2025    ANIONGAP 10 05/09/2025    BUN 20 05/09/2025    CREATININE 0.76 05/09/2025    GFRMALE >90 06/21/2023    CALCIUM 8.8 05/09/2025    ALBUMIN 4.7 05/09/2025        No results found for: \"BNP\", \"HGBA1C\"    Last Cardiology Tests:  N/A    Lab review: I have personally reviewed the laboratory " result(s).    Assessment/Plan  1) Tachycardia         Scribe Attestation  By signing my name below, I, Jen Russell, attest that this documentation has been prepared under the direction and in the presence of Tone Barrera MD.          [1]   Family History  Problem Relation Name Age of Onset    Crohn's disease Mother      Alcohol abuse Father      Ulcerative colitis Brother      Alcohol abuse Brother      Colon cancer Mother's Brother      Ulcerative colitis Mother's Brother      Diverticulitis Mother's Brother      Alcohol abuse Father's Brother      Coronary artery disease Maternal Grandmother      Stroke Maternal Grandmother Chata dalton 60 - 69    Heart attack Maternal Grandfather  60 - 69    Heart failure Paternal Grandfather  80 - 99

## 2025-07-11 NOTE — LETTER
July 11, 2025     MUMTAZ Espinoza, JUAN  [address] [facility] [address]  [address] [place] [id number]    Patient: Chetan Nam   YOB: 1990   Date of Visit: 7/11/2025       Dear MUMTAZ Aaron, JUAN:    Thank you for referring Chetan Nam to me for evaluation. Below are my notes for this consultation.  If you have questions, please do not hesitate to call me. I look forward to following your patient along with you.       Sincerely,     Tone Barrera MD      CC: No Recipients  ______________________________________________________________________________________    Referred by Dr. Joy for Tachycardia    Counseling:  The patient was counseled regarding diagnostic results, instructions for management, risk factor reductions, prognosis, patient and family education, impressions, risks and benefits of treatment options and importance of compliance with treatment.       History Of Present Illness:    Chetan Nam is a 35 y.o. male patient whose PMH is unremarkable. He presents today to establish cardiovascular care for the evaluation and management of tachycardia. The patient reports a 2 year history of progressively worsening tachycardia with light exercise/activity. He states that it feels like his heart races or pounds in his chest. He also reports that he gets fatigued more easily than usual. He denies any chest pain or discomfort. He reports occasional exertional SOB. The patient denies any dizziness, lightheadedness, presyncope or syncope. The patient denies any prior personal history of heart disease, but over the past 2 years his blood pressure has been elevated in the 140-150 systolic range, although has improved as of late. The patient's family history is positive for heart disease in his mother and father.     Past Surgical History:  He has a past surgical history that includes [place] tooth extraction.      Social History:  He reports that he has never smoked. He has never used  "smokeless tobacco. He reports that he does not currently use alcohol. He reports that he does not use drugs.    Family History:  Family History[1]     Allergies:  Patient has no known allergies.    Outpatient Medications:  Current Outpatient Medications   Medication Instructions   • ascorbic acid (VITAMIN C) 100 mg, Daily   • dicyclomine (BENTYL) 10 mg, oral, 4 times daily PRN   • ergocalciferol, vitamin D2, (VITAMIN D2 ORAL) Take by mouth.        Last Recorded Vitals:  Vitals:    07/11/25 1128   BP: 120/80   BP Location: Left arm   Patient Position: Sitting   BP Cuff Size: Adult   Pulse: 74   SpO2: 96%   Weight: 97.5 kg (215 lb)   Height: 1.88 m (6' 2\")       Review of Systems   Constitutional: Positive for malaise/fatigue.   Cardiovascular:  Positive for dyspnea on exertion and palpitations.   All other systems reviewed and are negative.     Physical Exam:  Constitutional:       Appearance: Healthy appearance. Not in distress.   Neck:      Vascular: No JVR. JVD normal.   Pulmonary:      Effort: Pulmonary effort is normal.      Breath sounds: Normal breath sounds. No wheezing. No rhonchi. No rales.   Chest:      Chest wall: Not tender to palpatation.   Cardiovascular:      PMI at left midclavicular line. Normal rate. Regular rhythm. Normal S1. Normal S2.       Murmurs: There is no murmur.      No gallop.  No click. No rub.   Pulses:     Intact distal pulses.   Edema:     Peripheral edema absent.   Abdominal:      General: Bowel sounds are normal.      Palpations: Abdomen is soft.      Tenderness: There is no abdominal tenderness.   Musculoskeletal: Normal range of motion.         General: No tenderness. Skin:     General: Skin is warm and dry.   Neurological:      General: No focal deficit present.      Mental Status: Alert and oriented to person, place and time.        Last Labs:  CBC -  Lab Results   Component Value Date    WBC 6.0 05/09/2025    HGB 15.8 05/09/2025    HCT 46.3 05/09/2025    MCV 85.9 05/09/2025    " " 05/09/2025       CMP -  Lab Results   Component Value Date    CALCIUM 8.8 05/09/2025    PROT 6.4 05/09/2025    ALBUMIN 4.7 05/09/2025    AST 18 05/09/2025    ALT 24 05/09/2025    ALKPHOS 68 05/09/2025    BILITOT 0.7 05/09/2025       LIPID PANEL -   Lab Results   Component Value Date    CHOL 141 05/09/2025    TRIG 54 05/09/2025    HDL 45 05/09/2025    CHHDL 3.1 05/09/2025    LDLF 70 06/21/2023    VLDL 21 05/01/2024    NHDL 96 05/09/2025       RENAL FUNCTION PANEL -   Lab Results   Component Value Date    GLUCOSE 97 05/09/2025     05/09/2025    K 4.3 05/09/2025     05/09/2025    CO2 27 05/09/2025    ANIONGAP 10 05/09/2025    BUN 20 05/09/2025    CREATININE 0.76 05/09/2025    GFRMALE >90 06/21/2023    CALCIUM 8.8 05/09/2025    ALBUMIN 4.7 05/09/2025        No results found for: \"BNP\", \"HGBA1C\"    Last Cardiology Tests:  N/A    Lab review: I have personally reviewed the laboratory result(s).    Assessment/Plan  1) Tachycardia   2-year h/o history of progressively worsening tachycardia with light exercise/activity  Reports easy fatigability   Denies chest pain or discomfort  Reports occasional exertional SOB  Denies dizziness, lightheadedness, presyncope or syncope  Denies any prior personal history of heart disease  BP has been elevated in 140-150 systolic range over the past 2 years, although has improved as of late  FH positive for heart disease in mother and father  Check 7-day Holter  Check exercise stress echo  F/U after testing        Scribe Attestation  By signing my name below, I, Jen Russell, attest that this documentation has been prepared under the direction and in the presence of Tone Barrera MD.        [1]  Family History  Problem Relation Name Age of Onset   • Crohn's disease Mother     • Alcohol abuse Father     • Ulcerative colitis Brother     • Alcohol abuse Brother     • Colon cancer Mother's Brother     • Ulcerative colitis Mother's Brother     • Diverticulitis " Mother's Brother     • Alcohol abuse Father's Brother     • Coronary artery disease Maternal Grandmother     • Stroke Maternal Grandmother Chata dalton 60 - 69   • Heart attack Maternal Grandfather  60 - 69   • Heart failure Paternal Grandfather  80 - 99        [1]  Family History  Problem Relation Name Age of Onset   • Crohn's disease Mother     • Alcohol abuse Father     • Ulcerative colitis Brother     • Alcohol abuse Brother     • Colon cancer Mother's Brother     • Ulcerative colitis Mother's Brother     • Diverticulitis Mother's Brother     • Alcohol abuse Father's Brother     • Coronary artery disease Maternal Grandmother     • Stroke Maternal Grandmother Chata dalton 60 - 69   • Heart attack Maternal Grandfather  60 - 69   • Heart failure Paternal Grandfather  80 - 99

## 2025-07-11 NOTE — PATIENT INSTRUCTIONS
Dr. Barrera has ordered a heart monitor to assess your heart rhythm.  Dr. Barrera has also ordered a stress test to ensure your heart is getting adequate blood flow and there is no evidence of any blockages within the heart arteries.    Followup with Dr. Barrera after the above tests.    If you have any questions or cardiac concerns, please call our office at [phone].

## 2025-07-23 ENCOUNTER — ANCILLARY PROCEDURE (OUTPATIENT)
Dept: CARDIOLOGY | Facility: HOSPITAL | Age: 35
End: 2025-07-23
Payer: COMMERCIAL

## 2025-07-23 DIAGNOSIS — R06.02 SHORTNESS OF BREATH: ICD-10-CM

## 2025-07-23 DIAGNOSIS — R00.0 RACING HEART BEAT: ICD-10-CM

## 2025-07-23 PROCEDURE — 93246 EXT ECG>7D<15D RECORDING: CPT

## 2025-07-25 PROCEDURE — 93248 EXT ECG>7D<15D REV&INTERPJ: CPT | Performed by: INTERNAL MEDICINE

## 2025-07-28 ENCOUNTER — TELEPHONE (OUTPATIENT)
Dept: CARDIOLOGY | Facility: HOSPITAL | Age: 35
End: 2025-07-28
Payer: COMMERCIAL

## 2025-07-28 DIAGNOSIS — R00.0 RACING HEART BEAT: Primary | ICD-10-CM

## 2025-07-28 NOTE — TELEPHONE ENCOUNTER
Called pt, no answer and no answering machine, called to discuss referral to EP per Dr. Barrera. I will try to send him a message via [employer].

## 2025-07-28 NOTE — TELEPHONE ENCOUNTER
Called and spoke w/ pt regarding EP referral. Pt agreeable to see Dr. Chappell 9/17 3:30pm. While OTP, noticed pt needed to be r/s for 8/8 appt w/ Dr. Barrera (out of office). Pt agreeable to move this appt to 8/29 2:30pm.

## 2025-08-01 ENCOUNTER — HOSPITAL ENCOUNTER (OUTPATIENT)
Dept: CARDIOLOGY | Facility: HOSPITAL | Age: 35
Discharge: HOME | End: 2025-08-01
Payer: COMMERCIAL

## 2025-08-01 DIAGNOSIS — R00.0 RACING HEART BEAT: ICD-10-CM

## 2025-08-01 DIAGNOSIS — R06.02 SHORTNESS OF BREATH: ICD-10-CM

## 2025-08-01 PROCEDURE — 93018 CV STRESS TEST I&R ONLY: CPT | Performed by: STUDENT IN AN ORGANIZED HEALTH CARE EDUCATION/TRAINING PROGRAM

## 2025-08-01 PROCEDURE — C8928 TTE W OR W/O FOL W/CON,STRES: HCPCS

## 2025-08-01 PROCEDURE — 93016 CV STRESS TEST SUPVJ ONLY: CPT | Performed by: STUDENT IN AN ORGANIZED HEALTH CARE EDUCATION/TRAINING PROGRAM

## 2025-08-01 PROCEDURE — 93350 STRESS TTE ONLY: CPT | Performed by: STUDENT IN AN ORGANIZED HEALTH CARE EDUCATION/TRAINING PROGRAM

## 2025-08-01 PROCEDURE — 2500000004 HC RX 250 GENERAL PHARMACY W/ HCPCS (ALT 636 FOR OP/ED): Performed by: STUDENT IN AN ORGANIZED HEALTH CARE EDUCATION/TRAINING PROGRAM

## 2025-08-01 RX ADMIN — PERFLUTREN 1.5 ML OF DILUTION: 6.52 INJECTION, SUSPENSION INTRAVENOUS at 08:41

## 2025-08-08 ENCOUNTER — APPOINTMENT (OUTPATIENT)
Dept: CARDIOLOGY | Facility: HOSPITAL | Age: 35
End: 2025-08-08
Payer: COMMERCIAL

## 2025-08-29 ENCOUNTER — APPOINTMENT (OUTPATIENT)
Dept: CARDIOLOGY | Facility: HOSPITAL | Age: 35
End: 2025-08-29
Payer: COMMERCIAL

## 2025-08-30 ASSESSMENT — ENCOUNTER SYMPTOMS
PALPITATIONS: 1
DYSPNEA ON EXERTION: 1

## 2025-09-02 ENCOUNTER — OFFICE VISIT (OUTPATIENT)
Dept: CARDIOLOGY | Facility: HOSPITAL | Age: 35
End: 2025-09-02
Payer: COMMERCIAL

## 2025-09-02 VITALS
WEIGHT: 220 LBS | HEIGHT: 74 IN | DIASTOLIC BLOOD PRESSURE: 78 MMHG | HEART RATE: 83 BPM | SYSTOLIC BLOOD PRESSURE: 138 MMHG | BODY MASS INDEX: 28.23 KG/M2

## 2025-09-02 DIAGNOSIS — R00.0 RACING HEART BEAT: ICD-10-CM

## 2025-09-02 DIAGNOSIS — I47.29 OTHER VENTRICULAR TACHYCARDIA: Primary | ICD-10-CM

## 2025-09-02 DIAGNOSIS — R06.02 SHORTNESS OF BREATH: ICD-10-CM

## 2025-09-02 PROCEDURE — 3008F BODY MASS INDEX DOCD: CPT | Performed by: INTERNAL MEDICINE

## 2025-09-02 PROCEDURE — 1036F TOBACCO NON-USER: CPT | Performed by: INTERNAL MEDICINE

## 2025-09-02 PROCEDURE — 99213 OFFICE O/P EST LOW 20 MIN: CPT | Performed by: INTERNAL MEDICINE

## 2025-09-02 PROCEDURE — 99212 OFFICE O/P EST SF 10 MIN: CPT

## 2025-09-02 ASSESSMENT — ENCOUNTER SYMPTOMS
LOSS OF SENSATION IN FEET: 0
OCCASIONAL FEELINGS OF UNSTEADINESS: 0
DEPRESSION: 0

## 2025-09-17 ENCOUNTER — APPOINTMENT (OUTPATIENT)
Dept: CARDIOLOGY | Facility: HOSPITAL | Age: 35
End: 2025-09-17
Payer: COMMERCIAL

## 2025-10-06 ENCOUNTER — APPOINTMENT (OUTPATIENT)
Dept: CARDIOLOGY | Facility: HOSPITAL | Age: 35
End: 2025-10-06
Payer: COMMERCIAL

## 2026-05-18 ENCOUNTER — APPOINTMENT (OUTPATIENT)
Dept: PRIMARY CARE | Facility: CLINIC | Age: 36
End: 2026-05-18
Payer: COMMERCIAL